# Patient Record
Sex: MALE | HISPANIC OR LATINO | Employment: PART TIME | URBAN - NONMETROPOLITAN AREA
[De-identification: names, ages, dates, MRNs, and addresses within clinical notes are randomized per-mention and may not be internally consistent; named-entity substitution may affect disease eponyms.]

---
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
## 2020-08-25 ENCOUNTER — OFFICE VISIT (OUTPATIENT)
Dept: FAMILY MEDICINE CLINIC | Facility: CLINIC | Age: 20
End: 2020-08-25

## 2020-08-25 VITALS
HEIGHT: 67 IN | WEIGHT: 207.8 LBS | TEMPERATURE: 98.6 F | SYSTOLIC BLOOD PRESSURE: 130 MMHG | BODY MASS INDEX: 32.62 KG/M2 | DIASTOLIC BLOOD PRESSURE: 82 MMHG | HEART RATE: 110 BPM | RESPIRATION RATE: 18 BRPM | OXYGEN SATURATION: 98 %

## 2020-08-25 DIAGNOSIS — K92.0 HEMATEMESIS WITH NAUSEA: ICD-10-CM

## 2020-08-25 DIAGNOSIS — E66.09 CLASS 1 OBESITY DUE TO EXCESS CALORIES WITH SERIOUS COMORBIDITY AND BODY MASS INDEX (BMI) OF 32.0 TO 32.9 IN ADULT: ICD-10-CM

## 2020-08-25 DIAGNOSIS — Z13.220 LIPID SCREENING: ICD-10-CM

## 2020-08-25 DIAGNOSIS — K21.00 GERD WITH ESOPHAGITIS: Primary | ICD-10-CM

## 2020-08-25 DIAGNOSIS — R00.0 TACHYCARDIA: ICD-10-CM

## 2020-08-25 PROBLEM — E66.811 CLASS 1 OBESITY DUE TO EXCESS CALORIES WITH SERIOUS COMORBIDITY AND BODY MASS INDEX (BMI) OF 32.0 TO 32.9 IN ADULT: Status: ACTIVE | Noted: 2020-08-25

## 2020-08-25 PROCEDURE — 99202 OFFICE O/P NEW SF 15 MIN: CPT | Performed by: FAMILY MEDICINE

## 2020-08-25 RX ORDER — PANTOPRAZOLE SODIUM 40 MG/1
40 TABLET, DELAYED RELEASE ORAL 2 TIMES DAILY
Qty: 60 TABLET | Refills: 0 | Status: SHIPPED | OUTPATIENT
Start: 2020-08-25 | End: 2020-09-18

## 2020-08-25 NOTE — PROGRESS NOTES
Subjective cc: GERD   Ezekiel Palmer is a 19 y.o. male who presents with complaint of abd pain with vomiting - sometimes blood ( 3x in the past few months) worse in the past 3 days.  He is complaining of cough, bloating and abd pain, worse at night lying down, wakes from sleep. Nothing makes it better or worse. Poor compliance with medications. He has not been on PPI in the past 3 years.     Depression/anxiety: trazodone and prozac - doesn't take them consistently  H/o ulcers   GERD: omeprazole   Hiatal hernia   Has seen GI in the past - EGD 4-5 years ago     PSH: EGD and dental surgery   No past hospitalization   Grad [employer] with some college     Former smoker, not currently smoking   Drinks alcohol - 4-5 shots per week   Smokes MJ weekly     FH: DM in mother and grandparents, CAD in father, cancer - unknown in great grandmother.     Heartburn   He complains of abdominal pain, coughing and heartburn. He reports no belching, no chest pain, no choking, no dysphagia, no early satiety, no globus sensation, no hoarse voice, no nausea, no sore throat, no stridor, no tooth decay, no water brash or no wheezing. This is a recurrent problem. The current episode started more than 1 year ago. The problem occurs constantly. The problem has been gradually worsening. The heartburn is located in the substernum. The heartburn is of moderate intensity. The heartburn wakes him from sleep. The heartburn limits his activity. The heartburn changes with position. The symptoms are aggravated by certain foods and lying down. Pertinent negatives include no anemia, fatigue, melena, muscle weakness, orthopnea or weight loss. Risk factors include ETOH use, lack of exercise, NSAIDs, obesity and smoking/tobacco exposure. He has tried a PPI for the symptoms. The treatment provided mild relief. Past procedures include an EGD.        The following portions of the patient's history were reviewed and updated as appropriate: allergies,  "current medications, past family history, past medical history, past social history, past surgical history and problem list.        Review of Systems   Constitutional: Negative for activity change, appetite change, fatigue and weight loss.   HENT: Negative for hoarse voice and sore throat.    Respiratory: Positive for cough. Negative for choking and wheezing.    Cardiovascular: Negative for chest pain.   Gastrointestinal: Positive for abdominal pain and heartburn. Negative for dysphagia, melena and nausea.   Musculoskeletal: Negative for muscle weakness.   All other systems reviewed and are negative.      Objective   Blood pressure 130/82, pulse 110, temperature 98.6 °F (37 °C), temperature source Infrared, resp. rate 18, height 171 cm (67.32\"), weight 94.3 kg (207 lb 12.8 oz), SpO2 98 %.  Physical Exam   Constitutional: He is oriented to person, place, and time. He appears well-developed and well-nourished. No distress.   obese   HENT:   Head: Normocephalic and atraumatic.   Right Ear: External ear normal.   Left Ear: External ear normal.   Eyes: Conjunctivae and EOM are normal. Right eye exhibits no discharge. Left eye exhibits no discharge.   Neck: Normal range of motion. No tracheal deviation present. No thyromegaly present.   Cardiovascular: Regular rhythm, normal heart sounds and intact distal pulses.   Pulmonary/Chest: Effort normal and breath sounds normal. No stridor. No respiratory distress. He has no wheezes. He exhibits no tenderness.   Abdominal: Soft. Bowel sounds are normal. He exhibits no distension. There is no tenderness.   Musculoskeletal: Normal range of motion. He exhibits no edema.   Lymphadenopathy:     He has no cervical adenopathy.   Neurological: He is alert and oriented to person, place, and time. He exhibits normal muscle tone. Coordination normal.   Skin: Skin is warm and dry. He is not diaphoretic.   Psychiatric: He has a normal mood and affect. His behavior is normal. Judgment and " thought content normal.   Nursing note and vitals reviewed.      Assessment/Plan   Problems Addressed this Visit        Digestive    GERD with esophagitis - Primary    Relevant Medications    pantoprazole (PROTONIX) 40 MG EC tablet    Other Relevant Orders    Ambulatory Referral to Gastroenterology    Comprehensive Metabolic Panel    CBC (No Diff)    Class 1 obesity due to excess calories with serious comorbidity and body mass index (BMI) of 32.0 to 32.9 in adult      Other Visit Diagnoses     Hematemesis with nausea        Relevant Medications    pantoprazole (PROTONIX) 40 MG EC tablet    Other Relevant Orders    Ambulatory Referral to Gastroenterology    Comprehensive Metabolic Panel    CBC (No Diff)    Lipid screening        Relevant Orders    Lipid Panel    Tachycardia        Relevant Orders    TSH    T4, Free          PLAN:     #1 GERD: new to me, chronic for pt, restart on PPI at increased dosing, referral to GI for EGD, advised on lifestyle changes - quit using NSAIDs, lose weight, no alcohol use, advised on warning signs and need to return if worsening     #2 obesity: Patient's Body mass index is 32.23 kg/m². BMI is above normal parameters. Recommendations include: educational material, exercise counseling and nutrition counseling.    #3 tachycardia: new, will get labs           This document has been electronically signed by Jazzy Infante MD on August 25, 2020 10:10

## 2020-08-27 LAB
ALBUMIN SERPL-MCNC: 5.2 G/DL (ref 3.5–5.2)
ALBUMIN/GLOB SERPL: 2.6 G/DL
ALP SERPL-CCNC: 71 U/L (ref 39–117)
ALT SERPL-CCNC: 47 U/L (ref 1–41)
AST SERPL-CCNC: 35 U/L (ref 1–40)
BILIRUB SERPL-MCNC: 0.8 MG/DL (ref 0–1.2)
BUN SERPL-MCNC: 11 MG/DL (ref 6–20)
BUN/CREAT SERPL: 12.1 (ref 7–25)
CALCIUM SERPL-MCNC: 10.8 MG/DL (ref 8.6–10.5)
CHLORIDE SERPL-SCNC: 101 MMOL/L (ref 98–107)
CHOLEST SERPL-MCNC: 194 MG/DL (ref 0–200)
CO2 SERPL-SCNC: 28.5 MMOL/L (ref 22–29)
CREAT SERPL-MCNC: 0.91 MG/DL (ref 0.76–1.27)
ERYTHROCYTE [DISTWIDTH] IN BLOOD BY AUTOMATED COUNT: 13.1 % (ref 12.3–15.4)
GLOBULIN SER CALC-MCNC: 2 GM/DL
GLUCOSE SERPL-MCNC: 92 MG/DL (ref 65–99)
HCT VFR BLD AUTO: 51.2 % (ref 37.5–51)
HDLC SERPL-MCNC: 36 MG/DL (ref 40–60)
HGB BLD-MCNC: 16.5 G/DL (ref 13–17.7)
LDLC SERPL CALC-MCNC: 122 MG/DL (ref 0–100)
MCH RBC QN AUTO: 29.3 PG (ref 26.6–33)
MCHC RBC AUTO-ENTMCNC: 32.2 G/DL (ref 31.5–35.7)
MCV RBC AUTO: 90.8 FL (ref 79–97)
PLATELET # BLD AUTO: 362 10*3/MM3 (ref 140–450)
POTASSIUM SERPL-SCNC: 4.9 MMOL/L (ref 3.5–5.2)
PROT SERPL-MCNC: 7.2 G/DL (ref 6–8.5)
RBC # BLD AUTO: 5.64 10*6/MM3 (ref 4.14–5.8)
SODIUM SERPL-SCNC: 139 MMOL/L (ref 136–145)
T4 FREE SERPL-MCNC: 1.34 NG/DL (ref 0.93–1.7)
TRIGL SERPL-MCNC: 180 MG/DL (ref 0–150)
TSH SERPL DL<=0.005 MIU/L-ACNC: 0.98 UIU/ML (ref 0.27–4.2)
VLDLC SERPL CALC-MCNC: 36 MG/DL
WBC # BLD AUTO: 6.26 10*3/MM3 (ref 3.4–10.8)

## 2020-09-17 ENCOUNTER — TELEPHONE (OUTPATIENT)
Dept: PODIATRY | Facility: CLINIC | Age: 20
End: 2020-09-17

## 2020-09-17 NOTE — TELEPHONE ENCOUNTER
Pt's mother (soy) called to notify us that the protonix was not covered by pt's ins and wanted to know if you could call in an alternative

## 2020-09-18 DIAGNOSIS — K21.00 GERD WITH ESOPHAGITIS: Primary | ICD-10-CM

## 2020-09-18 RX ORDER — OMEPRAZOLE 40 MG/1
40 CAPSULE, DELAYED RELEASE ORAL DAILY
Qty: 90 CAPSULE | Refills: 0 | Status: ON HOLD | OUTPATIENT
Start: 2020-09-18 | End: 2020-11-16 | Stop reason: SDUPTHER

## 2020-10-02 ENCOUNTER — TELEMEDICINE (OUTPATIENT)
Dept: GASTROENTEROLOGY | Facility: CLINIC | Age: 20
End: 2020-10-02

## 2020-10-02 ENCOUNTER — TELEPHONE (OUTPATIENT)
Dept: GASTROENTEROLOGY | Facility: CLINIC | Age: 20
End: 2020-10-02

## 2020-10-02 VITALS — BODY MASS INDEX: 32.49 KG/M2 | WEIGHT: 207 LBS | HEIGHT: 67 IN

## 2020-10-02 DIAGNOSIS — R11.0 NAUSEA: ICD-10-CM

## 2020-10-02 DIAGNOSIS — R10.13 EPIGASTRIC PAIN: Primary | ICD-10-CM

## 2020-10-02 DIAGNOSIS — K21.9 GASTROESOPHAGEAL REFLUX DISEASE, UNSPECIFIED WHETHER ESOPHAGITIS PRESENT: ICD-10-CM

## 2020-10-02 PROCEDURE — 99204 OFFICE O/P NEW MOD 45 MIN: CPT | Performed by: NURSE PRACTITIONER

## 2020-10-02 NOTE — PROGRESS NOTES
"[facility] GASTROENTEROLOGY - OFFICE NOTE    10/2/2020    Ezekiel Palmer   2000    Primary Physician: Jazzy Infante MD    Chief Complaint   Patient presents with   • Nausea     for a couple years worse over the past year ulcer and hernia on egd 2017 Dr Huber in [place]   • Heartburn         HISTORY OF PRESENT ILLNESS:    Ezekiel Palmer is a 19 y.o. male presents with abdominal pain. This is frankie. This is intermittent, however occurs daily. Described as a sharp pain.  Triggers are acidic and spicy foods.   Associated symptoms area nausea, vomiting, and belching. Belching does help.  He has having some reflux symptoms as well.  Was taking ibuprofen qod for the frankie pain since 2017 and stopped recently. Was taking a \"very large pills for the pain \" since 2017 and did help ( 2017 and 2018).  States that he has lost weight ( 8 - 10 #). Appetite is good. No black stool. No hematemesis. No fever.  No bright red blood per rectum.    He recently was prescribed omeprazole by his PCP however he has not started taking.  He is taking Carafate daily.    I did review an ultrasound of the gallbladder that was done in 2017 in [place] and that was unremarkable.    Last EGD July 2017 by Dr. Huber ( [place]) noting severe esophagitis and diffuse moderate inflammation/erythema in the stomach, and a hiatal hernia.  H. pylori was negative.  Antrum biopsy path benign.  Esophageal biopsy path ulcer/benign.    Has never had gallbladder test.       Past Medical History:   Diagnosis Date   • Acute gastroenteritis    • Allergic rhinitis    • Diarrhea    • Exposure to tobacco smoke    • Gastroenteritis    • GERD (gastroesophageal reflux disease)    • Headache    • Nausea and vomiting     with body aches   • Rhinitis    • Tachycardia    • Upper respiratory infection    • Well child check        Past Surgical History:   Procedure Laterality Date   • DENTAL PROCEDURE  07/03/2001    " frenuloplasty Dr. Pearlta   • ENDOSCOPY N/A 7/28/2017    Procedure: ESOPHAGOGASTRODUODENOSCOPY possible dilation ;  Surgeon: Rodney Huber MD;  Location: [facility] ENDOSCOPY;  Service:    • TYMPANOSTOMY  07/03/2001    Dr. Peralta       Outpatient Medications Marked as Taking for the 10/2/20 encounter (Telemedicine) with Ro Hill APRN   Medication Sig Dispense Refill   • loratadine (CLARITIN) 10 MG tablet Take 10 mg by mouth.     • omeprazole (priLOSEC) 40 MG capsule Take 1 capsule by mouth Daily. 90 capsule 0   • sucralfate (CARAFATE) 1 G tablet Take 1 tablet by mouth 4 (Four) Times a Day. 120 tablet 3       Allergies   Allergen Reactions   • Mold Extract  [Trichophyton]        Social History     Socioeconomic History   • Marital status: Single     Spouse name: Not on file   • Number of children: Not on file   • Years of education: Not on file   • Highest education level: Not on file   Tobacco Use   • Smoking status: Former Smoker   • Smokeless tobacco: Never Used   Substance and Sexual Activity   • Alcohol use: No   • Drug use: No   • Sexual activity: Defer       Family History   Problem Relation Age of Onset   • Diabetes Mother    • Heart attack Maternal Grandfather    • Hypertension Maternal Grandfather    • Diabetes Maternal Grandfather    • Lung cancer Other    • Colon cancer Neg Hx        Review of Systems   Constitutional: Negative for appetite change, chills, fatigue, fever and unexpected weight change.   HENT: Negative for sore throat and trouble swallowing.    Eyes: Negative for visual disturbance.   Respiratory: Negative for cough, shortness of breath and wheezing.    Cardiovascular: Negative for chest pain and palpitations.   Gastrointestinal: Positive for abdominal pain, nausea and vomiting. Negative for abdominal distention, anal bleeding, blood in stool, constipation and diarrhea.        As mentioned in hpi   Genitourinary: Negative for difficulty urinating and hematuria.   Musculoskeletal: Negative  "for arthralgias and back pain.   Skin: Negative for color change and rash.   Neurological: Negative for dizziness, seizures, syncope, light-headedness and headaches.   Hematological: Negative for adenopathy.   Psychiatric/Behavioral: Negative for confusion. The patient is not nervous/anxious.         Vitals:    10/02/20 0825   Weight: 93.9 kg (207 lb)   Height: 170.2 cm (67\")      Body mass index is 32.42 kg/m².    Physical Exam   Constitutional: He appears well-developed and well-nourished. No distress.   Pulmonary/Chest: Effort normal.   Neurological: He is alert.         Results for orders placed or performed in visit on 08/25/20   Comprehensive Metabolic Panel    Specimen: Blood   Result Value Ref Range    Glucose 92 65 - 99 mg/dL    BUN 11 6 - 20 mg/dL    Creatinine 0.91 0.76 - 1.27 mg/dL    eGFR Non African Am 105 >60 mL/min/1.73    eGFR African Am 127 >60 mL/min/1.73    BUN/Creatinine Ratio 12.1 7.0 - 25.0    Sodium 139 136 - 145 mmol/L    Potassium 4.9 3.5 - 5.2 mmol/L    Chloride 101 98 - 107 mmol/L    Total CO2 28.5 22.0 - 29.0 mmol/L    Calcium 10.8 (H) 8.6 - 10.5 mg/dL    Total Protein 7.2 6.0 - 8.5 g/dL    Albumin 5.20 3.50 - 5.20 g/dL    Globulin 2.0 gm/dL    A/G Ratio 2.6 g/dL    Total Bilirubin 0.8 0.0 - 1.2 mg/dL    Alkaline Phosphatase 71 39 - 117 U/L    AST (SGOT) 35 1 - 40 U/L    ALT (SGPT) 47 (H) 1 - 41 U/L   Lipid Panel    Specimen: Blood   Result Value Ref Range    Total Cholesterol 194 0 - 200 mg/dL    Triglycerides 180 (H) 0 - 150 mg/dL    HDL Cholesterol 36 (L) 40 - 60 mg/dL    VLDL Cholesterol 36 mg/dL    LDL Cholesterol  122 (H) 0 - 100 mg/dL   CBC (No Diff)    Specimen: Blood   Result Value Ref Range    WBC 6.26 3.40 - 10.80 10*3/mm3    RBC 5.64 4.14 - 5.80 10*6/mm3    Hemoglobin 16.5 13.0 - 17.7 g/dL    Hematocrit 51.2 (H) 37.5 - 51.0 %    MCV 90.8 79.0 - 97.0 fL    MCH 29.3 26.6 - 33.0 pg    MCHC 32.2 31.5 - 35.7 g/dL    RDW 13.1 12.3 - 15.4 %    Platelets 362 140 - 450 10*3/mm3   TSH "    Specimen: Blood   Result Value Ref Range    TSH 0.976 0.270 - 4.200 uIU/mL   T4, Free    Specimen: Blood   Result Value Ref Range    Free T4 1.34 0.93 - 1.70 ng/dL           ASSESSMENT AND PLAN    Assessment/Plan     Ezekiel was seen today for nausea and heartburn.    Diagnoses and all orders for this visit:    Epigastric pain  -     Case Request; Standing  -     Case Request    Gastroesophageal reflux disease, unspecified whether esophagitis present  -     Case Request; Standing  -     Case Request    Nausea  -     Case Request; Standing  -     Case Request      Differential diagnoses discussed.  I do recommend upper endoscopy for further evaluation and he is agreeable..  I do recommend that he start taking omeprazole once daily in the morning.  I would also recommend continue taking Carafate daily.  I recommend no aspirin or NSAIDs.  Recommend ER for evaluation of severe pain.        Recent labs reviewed with a normal CBC and TSH.  Ultrasound gallbladder 2017 in [place] and was unremarkable.      ESOPHAGOGASTRODUODENOSCOPY WITH ANESTHESIA (N/A)   Risk, benefits, and alternatives of endoscopy were explained in full.  They understand that there is a risk of bleeding, perforation, and infection.  The risk of perforation goes up with esophageal dilation.  Other options to evaluate UGI complaints could involve barium swallow or UGI series, but these would be diagnostic tests only.  Patient was given time to ask questions.  I answered them to their satisfaction and they are agreeable to proceeding                  JAYNA Chau    EMR Dragon/transcription disclaimer:  Much of this encounter note is electronic transcription/translation of spoken language to printed text.  The electronic translation of spoken language may be erroneous, or at times, nonsensical words or phrases may be inadvertently transcribed.  Although I have reviewed the note for such errors, some may still exist.

## 2020-10-12 ENCOUNTER — TRANSCRIBE ORDERS (OUTPATIENT)
Dept: ADMINISTRATIVE | Facility: HOSPITAL | Age: 20
End: 2020-10-12

## 2020-10-19 ENCOUNTER — HOSPITAL ENCOUNTER (OUTPATIENT)
Facility: HOSPITAL | Age: 20
Setting detail: HOSPITAL OUTPATIENT SURGERY
Discharge: HOME | End: 2020-10-19
Attending: INTERNAL MEDICINE | Admitting: INTERNAL MEDICINE

## 2020-10-19 ENCOUNTER — TELEPHONE (OUTPATIENT)
Dept: GASTROENTEROLOGY | Facility: CLINIC | Age: 20
End: 2020-10-19

## 2020-10-19 NOTE — TELEPHONE ENCOUNTER
JUSTINE, the patient showed today for his procedure but did not have his recommended Covid testing.  As discussed with the staff, he was informed that it is required to have Covid testing preprocedure.  He unfortunately also showed 45 minutes late for his procedure.  With showing late we were not able to obtain Covid testing in time to do the procedure.  He will need to have the procedure rescheduled at a different time and have preprocedure Covid testing as discussed with him previously.  Please contact him and reschedule.

## 2020-11-06 ENCOUNTER — TRANSCRIBE ORDERS (OUTPATIENT)
Dept: LAB | Facility: HOSPITAL | Age: 20
End: 2020-11-06

## 2020-11-06 DIAGNOSIS — Z01.818 PRE-OP TESTING: Primary | ICD-10-CM

## 2020-11-13 ENCOUNTER — LAB (OUTPATIENT)
Dept: LAB | Facility: HOSPITAL | Age: 20
End: 2020-11-13

## 2020-11-13 PROCEDURE — C9803 HOPD COVID-19 SPEC COLLECT: HCPCS | Performed by: INTERNAL MEDICINE

## 2020-11-13 PROCEDURE — U0003 INFECTIOUS AGENT DETECTION BY NUCLEIC ACID (DNA OR RNA); SEVERE ACUTE RESPIRATORY SYNDROME CORONAVIRUS 2 (SARS-COV-2) (CORONAVIRUS DISEASE [COVID-19]), AMPLIFIED PROBE TECHNIQUE, MAKING USE OF HIGH THROUGHPUT TECHNOLOGIES AS DESCRIBED BY CMS-2020-01-R: HCPCS | Performed by: INTERNAL MEDICINE

## 2020-11-14 LAB
COVID LABCORP PRIORITY: NORMAL
SARS-COV-2 RNA RESP QL NAA+PROBE: NOT DETECTED

## 2020-11-16 ENCOUNTER — ANESTHESIA (OUTPATIENT)
Dept: GASTROENTEROLOGY | Facility: HOSPITAL | Age: 20
End: 2020-11-16

## 2020-11-16 ENCOUNTER — HOSPITAL ENCOUNTER (OUTPATIENT)
Facility: HOSPITAL | Age: 20
Setting detail: HOSPITAL OUTPATIENT SURGERY
Discharge: HOME | End: 2020-11-16
Attending: INTERNAL MEDICINE | Admitting: INTERNAL MEDICINE

## 2020-11-16 ENCOUNTER — PREP FOR SURGERY (OUTPATIENT)
Dept: OTHER | Facility: HOSPITAL | Age: 20
End: 2020-11-16

## 2020-11-16 ENCOUNTER — ANESTHESIA EVENT (OUTPATIENT)
Dept: GASTROENTEROLOGY | Facility: HOSPITAL | Age: 20
End: 2020-11-16

## 2020-11-16 VITALS
HEIGHT: 67 IN | BODY MASS INDEX: 33.9 KG/M2 | RESPIRATION RATE: 19 BRPM | HEART RATE: 73 BPM | OXYGEN SATURATION: 100 % | TEMPERATURE: 97.1 F | SYSTOLIC BLOOD PRESSURE: 118 MMHG | WEIGHT: 216 LBS | DIASTOLIC BLOOD PRESSURE: 81 MMHG

## 2020-11-16 DIAGNOSIS — K21.00 GERD WITH ESOPHAGITIS: ICD-10-CM

## 2020-11-16 DIAGNOSIS — K21.9 GERD (GASTROESOPHAGEAL REFLUX DISEASE): ICD-10-CM

## 2020-11-16 DIAGNOSIS — K21.00 GASTROESOPHAGEAL REFLUX DISEASE WITH ESOPHAGITIS, UNSPECIFIED WHETHER HEMORRHAGE: Primary | ICD-10-CM

## 2020-11-16 PROCEDURE — 88305 TISSUE EXAM BY PATHOLOGIST: CPT | Performed by: INTERNAL MEDICINE

## 2020-11-16 PROCEDURE — 43239 EGD BIOPSY SINGLE/MULTIPLE: CPT | Performed by: INTERNAL MEDICINE

## 2020-11-16 PROCEDURE — 25010000002 PROPOFOL 10 MG/ML EMULSION: Performed by: NURSE ANESTHETIST, CERTIFIED REGISTERED

## 2020-11-16 RX ORDER — LIDOCAINE HYDROCHLORIDE 10 MG/ML
0.5 INJECTION, SOLUTION EPIDURAL; INFILTRATION; INTRACAUDAL; PERINEURAL ONCE AS NEEDED
Status: DISCONTINUED | OUTPATIENT
Start: 2020-11-16 | End: 2020-11-16 | Stop reason: HOSPADM

## 2020-11-16 RX ORDER — OMEPRAZOLE 40 MG/1
40 CAPSULE, DELAYED RELEASE ORAL
Qty: 180 CAPSULE | Refills: 3 | Status: SHIPPED | OUTPATIENT
Start: 2020-11-16 | End: 2020-11-24 | Stop reason: SDUPTHER

## 2020-11-16 RX ORDER — PROPOFOL 10 MG/ML
VIAL (ML) INTRAVENOUS AS NEEDED
Status: DISCONTINUED | OUTPATIENT
Start: 2020-11-16 | End: 2020-11-16 | Stop reason: SURG

## 2020-11-16 RX ORDER — SODIUM CHLORIDE 0.9 % (FLUSH) 0.9 %
10 SYRINGE (ML) INJECTION AS NEEDED
Status: DISCONTINUED | OUTPATIENT
Start: 2020-11-16 | End: 2020-11-16 | Stop reason: HOSPADM

## 2020-11-16 RX ORDER — ONDANSETRON 2 MG/ML
4 INJECTION INTRAMUSCULAR; INTRAVENOUS ONCE AS NEEDED
Status: DISCONTINUED | OUTPATIENT
Start: 2020-11-16 | End: 2020-11-16 | Stop reason: HOSPADM

## 2020-11-16 RX ORDER — SODIUM CHLORIDE 9 MG/ML
500 INJECTION, SOLUTION INTRAVENOUS CONTINUOUS PRN
Status: DISCONTINUED | OUTPATIENT
Start: 2020-11-16 | End: 2020-11-16 | Stop reason: HOSPADM

## 2020-11-16 RX ADMIN — SODIUM CHLORIDE: 9 INJECTION, SOLUTION INTRAVENOUS at 10:44

## 2020-11-16 RX ADMIN — LIDOCAINE HYDROCHLORIDE 100 MG: 20 INJECTION, SOLUTION INTRAVENOUS at 10:46

## 2020-11-16 RX ADMIN — PROPOFOL 50 MG: 10 INJECTION, EMULSION INTRAVENOUS at 10:47

## 2020-11-16 RX ADMIN — SODIUM CHLORIDE 500 ML: 9 INJECTION, SOLUTION INTRAVENOUS at 10:20

## 2020-11-16 RX ADMIN — PROPOFOL 100 MG: 10 INJECTION, EMULSION INTRAVENOUS at 10:46

## 2020-11-16 NOTE — ANESTHESIA PREPROCEDURE EVALUATION
Anesthesia Evaluation     Patient summary reviewed and Nursing notes reviewed   no history of anesthetic complications:  NPO Solid Status: > 8 hours  NPO Liquid Status: > 4 hours           Airway   Mallampati: II  TM distance: >3 FB  Neck ROM: full  no difficulty expected  Dental      Pulmonary - negative pulmonary ROS   Cardiovascular - negative cardio ROS  Exercise tolerance: excellent (>7 METS)        Neuro/Psych  (+) headaches,     GI/Hepatic/Renal/Endo    (+) obesity,  GERD,      Musculoskeletal (-) negative ROS    Abdominal    Substance History - negative use     OB/GYN negative ob/gyn ROS         Other - negative ROS                         Anesthesia Plan    ASA 1     MAC       Anesthetic plan, all risks, benefits, and alternatives have been provided, discussed and informed consent has been obtained with: patient.

## 2020-11-16 NOTE — H&P
[facility] Gastroenterology  Pre Procedure History & Physical    Chief Complaint:   Heartburn    Subjective     HPI:   He came to see us in early October.  He was having some nausea, vomiting and belching.  He had excessive heartburn.  He has a history of reflux esophagitis diagnosed in outside facility in [place] 2017.  When he was in the office October 2 he was advised to go ahead and start omeprazole which he had been prescribed but had not started yet.  He states he has been taking this.  He states he feels much better.  He continues on Carafate daily as well.  He presents for endoscopy now.  Overall though he is improved he states.    Past Medical History:   Past Medical History:   Diagnosis Date   • Acute gastroenteritis    • Allergic rhinitis    • Diarrhea    • Exposure to tobacco smoke    • Gastroenteritis    • GERD (gastroesophageal reflux disease)    • Headache    • Nausea and vomiting     with body aches   • Rhinitis    • Tachycardia    • Upper respiratory infection    • Well child check        Past Surgical History:  Past Surgical History:   Procedure Laterality Date   • DENTAL PROCEDURE  07/03/2001    frenuloplasty Dr. Peralta   • ENDOSCOPY N/A 7/28/2017    Procedure: ESOPHAGOGASTRODUODENOSCOPY possible dilation ;  Surgeon: Rodney Huber MD;  Location: [facility] ENDOSCOPY;  Service:    • TYMPANOSTOMY  07/03/2001    Dr. Peralta        Family History:  Family History   Problem Relation Age of Onset   • Diabetes Mother    • Heart attack Maternal Grandfather    • Hypertension Maternal Grandfather    • Diabetes Maternal Grandfather    • Lung cancer Other    • Colon cancer Neg Hx        Social History:   reports that he has quit smoking. He has never used smokeless tobacco. He reports that he does not drink alcohol or use drugs.    Medications:   Prior to Admission medications    Medication Sig Start Date End Date Taking? Authorizing Provider   loratadine (CLARITIN) 10 MG tablet Take 10 mg by mouth.     "Provider, MD Leo   omeprazole (priLOSEC) 40 MG capsule Take 1 capsule by mouth Daily. 9/18/20   Jazzy Infante MD   sucralfate (CARAFATE) 1 G tablet Take 1 tablet by mouth 4 (Four) Times a Day. 8/10/17   Sayda Forrester APRN       Allergies:  Mold extract [trichophyton]    ROS:    General: Weight stable  Resp: No SOA  Cardiovascular: No CP    Objective     Blood pressure 139/83, pulse 70, temperature 97.1 °F (36.2 °C), temperature source Temporal, resp. rate 20, height 170.2 cm (67\"), weight 98 kg (216 lb), SpO2 99 %.    Physical Exam   Constitutional: Pt is oriented to person, place, and in no distress.   Cardiovascular: Normal rate, regular rhythm.    Pulmonary/Chest: Effort normal. No respiratory distress.    Abdominal: Non-distended.  Psychiatric: Mood, memory, affect and judgment appear normal.     Assessment/Plan     Diagnosis:  GERD with reflux esophagitis history    Anticipated Surgical Procedure:  Endoscopy    The risks, benefits, and alternatives of this procedure have been discussed with the patient or the responsible party- the patient understands and agrees to proceed.    EMR Dragon/transcription disclaimer:  Much of this encounter note is electronic transcription/translation of spoken language to printed text.  The electronic translation of spoken language may be erroneous, or at times, nonsensical words or phrases may be inadvertently transcribed.  Although I have reviewed the note for such errors, some may still exist.  "

## 2020-11-16 NOTE — DISCHARGE INSTRUCTIONS
You have another Endoscopy scheduled for January 18th, at 6:30 AM.  Dr. Rasheed's office will mail you your instructions.

## 2020-11-16 NOTE — ANESTHESIA POSTPROCEDURE EVALUATION
"Patient: Ezekiel Palmer    Procedure Summary     Date: 11/16/20 Room / Location: [facility] ENDOSCOPY 5 / [facility] ENDOSCOPY    Anesthesia Start: 1044 Anesthesia Stop: 1058    Procedure: ESOPHAGOGASTRODUODENOSCOPY WITH ANESTHESIA (N/A ) Diagnosis:     Surgeon: Bill Rasheed MD Provider: Nash Ying CRNA    Anesthesia Type: MAC ASA Status: 1          Anesthesia Type: MAC    Vitals  Vitals Value Taken Time   BP 91/78 11/16/20 1110   Temp     Pulse 74 11/16/20 1114   Resp     SpO2 98 % 11/16/20 1114   Vitals shown include unvalidated device data.        Post Anesthesia Care and Evaluation    Patient location during evaluation: PACU  Patient participation: complete - patient participated  Level of consciousness: awake and alert  Pain management: adequate  Airway patency: patent  Anesthetic complications: No anesthetic complications    Cardiovascular status: acceptable  Respiratory status: acceptable  Hydration status: acceptable    Comments: Blood pressure 139/83, pulse 70, temperature 97.1 °F (36.2 °C), temperature source Temporal, resp. rate 20, height 170.2 cm (67\"), weight 98 kg (216 lb), SpO2 99 %.    Pt discharged from PACU based on tawanna score >8      "

## 2020-11-17 LAB
CYTO UR: NORMAL
LAB AP CASE REPORT: NORMAL
LAB AP DIAGNOSIS COMMENT: NORMAL
PATH REPORT.FINAL DX SPEC: NORMAL
PATH REPORT.GROSS SPEC: NORMAL

## 2020-11-18 PROBLEM — K21.00 GASTROESOPHAGEAL REFLUX DISEASE WITH ESOPHAGITIS: Status: ACTIVE | Noted: 2020-11-18

## 2020-11-24 ENCOUNTER — OFFICE VISIT (OUTPATIENT)
Dept: FAMILY MEDICINE CLINIC | Facility: CLINIC | Age: 20
End: 2020-11-24

## 2020-11-24 VITALS
DIASTOLIC BLOOD PRESSURE: 84 MMHG | WEIGHT: 216.8 LBS | SYSTOLIC BLOOD PRESSURE: 124 MMHG | OXYGEN SATURATION: 98 % | TEMPERATURE: 97.7 F | RESPIRATION RATE: 18 BRPM | HEIGHT: 67 IN | BODY MASS INDEX: 34.03 KG/M2 | HEART RATE: 86 BPM

## 2020-11-24 DIAGNOSIS — E66.09 CLASS 1 OBESITY DUE TO EXCESS CALORIES WITH SERIOUS COMORBIDITY AND BODY MASS INDEX (BMI) OF 33.0 TO 33.9 IN ADULT: ICD-10-CM

## 2020-11-24 DIAGNOSIS — K21.00 GERD WITH ESOPHAGITIS: Primary | ICD-10-CM

## 2020-11-24 PROCEDURE — 99213 OFFICE O/P EST LOW 20 MIN: CPT | Performed by: FAMILY MEDICINE

## 2020-11-24 RX ORDER — OMEPRAZOLE 40 MG/1
40 CAPSULE, DELAYED RELEASE ORAL
Qty: 180 CAPSULE | Refills: 3 | Status: SHIPPED | OUTPATIENT
Start: 2020-11-24

## 2020-11-24 NOTE — PROGRESS NOTES
Subjective cc: GERD   Ezekiel Palmer is a 19 y.o. male who presents with esophagitis - had EGD with GI - has not been able to get his PPI to start on it.   Reviewed EGD report   Pt is gaining weight - works at [employer] - eats a lot of bread     Past information reviewed   ===========================  Depression/anxiety: trazodone and prozac - doesn't take them consistently  H/o ulcers   GERD: omeprazole   Hiatal hernia   Has seen GI in the past - EGD 4-5 years ago     PSH: EGD and dental surgery   No past hospitalization   Grad [employer] with some college     Former smoker, not currently smoking   Drinks alcohol - 4-5 shots per week   Smokes MJ weekly     FH: DM in mother and grandparents, CAD in father, cancer - unknown in great grandmother.     Heartburn  He complains of abdominal pain and heartburn. He reports no belching, no chest pain, no choking, no dysphagia, no early satiety, no globus sensation, no hoarse voice, no nausea, no sore throat, no stridor, no tooth decay, no water brash or no wheezing. This is a recurrent problem. The current episode started more than 1 year ago. The problem occurs constantly. The problem has been gradually worsening. The heartburn is located in the substernum. The heartburn is of moderate intensity. The heartburn wakes him from sleep. The heartburn limits his activity. The heartburn changes with position. The symptoms are aggravated by certain foods and lying down. Pertinent negatives include no anemia, fatigue, melena, muscle weakness, orthopnea or weight loss. Risk factors include ETOH use, lack of exercise, NSAIDs, obesity and smoking/tobacco exposure. He has tried a PPI for the symptoms. The treatment provided mild relief. Past procedures include an EGD.        The following portions of the patient's history were reviewed and updated as appropriate: allergies, current medications, past family history, past medical history, past social history, past surgical history and  "problem list.        Review of Systems   Constitutional: Negative for activity change, appetite change, fatigue and weight loss.   HENT: Negative for hoarse voice and sore throat.    Respiratory: Negative for choking and wheezing.    Cardiovascular: Negative for chest pain.   Gastrointestinal: Positive for abdominal pain and heartburn. Negative for dysphagia, melena and nausea.   Musculoskeletal: Negative for muscle weakness.   All other systems reviewed and are negative.      Objective   Blood pressure 124/84, pulse 86, temperature 97.7 °F (36.5 °C), temperature source Infrared, resp. rate 18, height 170.2 cm (67\"), weight 98.3 kg (216 lb 12.8 oz), SpO2 98 %.  Physical Exam   Constitutional: He is oriented to person, place, and time. He appears well-developed. No distress.   obese   HENT:   Head: Normocephalic and atraumatic.   Right Ear: External ear normal.   Left Ear: External ear normal.   Eyes: Conjunctivae are normal. Right eye exhibits no discharge. Left eye exhibits no discharge.   Neck: Normal range of motion. No tracheal deviation present. No thyromegaly present.   Cardiovascular: Normal rate, regular rhythm and normal heart sounds.   Pulmonary/Chest: Effort normal and breath sounds normal. No stridor. No respiratory distress. He has no wheezes. He exhibits no tenderness.   Abdominal: Soft. Bowel sounds are normal. He exhibits no distension. There is no abdominal tenderness.   Musculoskeletal: Normal range of motion.   Lymphadenopathy:     He has no cervical adenopathy.   Neurological: He is alert and oriented to person, place, and time. He exhibits normal muscle tone. Coordination normal.   Skin: Skin is warm and dry. He is not diaphoretic.   Psychiatric: His behavior is normal. Judgment and thought content normal.   Nursing note and vitals reviewed.      Assessment/Plan   Problems Addressed this Visit        Digestive    GERD with esophagitis - Primary    Relevant Medications    omeprazole (priLOSEC) 40 " MG capsule      Other Visit Diagnoses     Class 1 obesity due to excess calories with serious comorbidity and body mass index (BMI) of 33.0 to 33.9 in adult          Diagnoses       Codes Comments    GERD with esophagitis    -  Primary ICD-10-CM: K21.00  ICD-9-CM: 530.11     Class 1 obesity due to excess calories with serious comorbidity and body mass index (BMI) of 33.0 to 33.9 in adult     ICD-10-CM: E66.09, Z68.33  ICD-9-CM: 278.00, V85.33           PLAN:     #1 GERD: uncontrolled, chronic for pt, restart on PPI at increased dosing - call if issues filling rx, reviewed EGD, advised on lifestyle changes - quit using NSAIDs, lose weight, no alcohol use, advised on warning signs and need to return if worsening     #2 obesity: Patient's Body mass index is 33.96 kg/m². BMI is above normal parameters. Recommendations include: educational material, exercise counseling and nutrition counseling.          This document has been electronically signed by Jazzy Infante MD on November 24, 2020 09:33 CST

## 2021-01-08 ENCOUNTER — TRANSCRIBE ORDERS (OUTPATIENT)
Dept: LAB | Facility: HOSPITAL | Age: 21
End: 2021-01-08

## 2021-01-08 DIAGNOSIS — Z01.818 PRE-OP TESTING: Primary | ICD-10-CM

## 2023-03-29 ENCOUNTER — OFFICE VISIT (OUTPATIENT)
Dept: GASTROENTEROLOGY | Facility: CLINIC | Age: 23
End: 2023-03-29
Payer: COMMERCIAL

## 2023-03-29 VITALS
TEMPERATURE: 96.6 F | HEART RATE: 70 BPM | DIASTOLIC BLOOD PRESSURE: 90 MMHG | WEIGHT: 201 LBS | SYSTOLIC BLOOD PRESSURE: 130 MMHG | HEIGHT: 67 IN | BODY MASS INDEX: 31.55 KG/M2 | OXYGEN SATURATION: 99 %

## 2023-03-29 DIAGNOSIS — R11.2 NAUSEA AND VOMITING, UNSPECIFIED VOMITING TYPE: ICD-10-CM

## 2023-03-29 DIAGNOSIS — Z87.19 HISTORY OF ESOPHAGITIS: ICD-10-CM

## 2023-03-29 DIAGNOSIS — R10.11 RIGHT UPPER QUADRANT ABDOMINAL PAIN: Primary | ICD-10-CM

## 2023-03-29 PROCEDURE — 1159F MED LIST DOCD IN RCRD: CPT | Performed by: NURSE PRACTITIONER

## 2023-03-29 PROCEDURE — 1160F RVW MEDS BY RX/DR IN RCRD: CPT | Performed by: NURSE PRACTITIONER

## 2023-03-29 PROCEDURE — 99214 OFFICE O/P EST MOD 30 MIN: CPT | Performed by: NURSE PRACTITIONER

## 2023-03-29 NOTE — PROGRESS NOTES
[facility] GASTROENTEROLOGY - OFFICE NOTE    3/29/2023    Ezekiel Palmer   2000    Primary Physician: Jazzy Infante MD     Referring Provider: Tory DORANTES    Chief Complaint   Patient presents with   • Abdominal Pain   • Vomiting         HISTORY OF PRESENT ILLNESS:     Ezekiel Palmer is a 22 y.o. male presents with ruq pain, nausea, and vomiting. He is referred by Tory DORANTES at [facility] in [place]. Ruq pain x 2 mo, intermittent. Typically occurs when having a bm.  Changed diet has helped. Resolves after belching and vomiting. He has nausea but not always with the vomiting. Has taken prilosec and carafate for a few years. He does miss doses.  He has tried pain med that helps at times. Takes motrin prn but not weekly. Takes advil prn but not weekly. He has history of ulcers. No fever. No black stool.          Having a bm daily. No rectal bleeding. Has noted some mucus the last 2 days. No weight loss.             UPPER GI ENDOSCOPY (11/16/2020 10:41) by Dr. Rasheed,recall 2 mo.   - LA Grade D reflux esophagitis despite daily omeprazole 40 mg. Biopsied.  - 3 cm hiatal hernia.  - Normal stomach.  - Normal examined duodenum.  Path   Final Diagnosis   Distal esophagus, endoscopic biopsy:  A.  Chronic active esophagitis, severe, with extensive ulceration.  B.  No histologic evidence of eosinophilic esophagitis or Saldivar's esophagus.  C.  No dysplasia identified.       Past Medical History:   Diagnosis Date   • Acute gastroenteritis    • Allergic rhinitis    • Diarrhea    • Exposure to tobacco smoke    • Gastroenteritis    • GERD (gastroesophageal reflux disease)    • Headache    • Nausea and vomiting     with body aches   • Rhinitis    • Tachycardia    • Upper respiratory infection    • Well child check        Past Surgical History:   Procedure Laterality Date   • DENTAL PROCEDURE  07/03/2001    frenuloplasty Dr. Peralta   • ENDOSCOPY N/A 7/28/2017     Procedure: ESOPHAGOGASTRODUODENOSCOPY possible dilation ;  Surgeon: Rodney Huber MD;  Location: [facility] ENDOSCOPY;  Service:    • ENDOSCOPY N/A 11/16/2020    Procedure: ESOPHAGOGASTRODUODENOSCOPY WITH ANESTHESIA;  Surgeon: Bill Rasheed MD;  Location: [facility] ENDOSCOPY;  Service: Gastroenterology;  Laterality: N/A;  pre op: gerd  post op:hiatal hernia, esophagitis  PCP: Jazzy Infante MD   • TYMPANOSTOMY  07/03/2001    Dr. Peralta       Outpatient Medications Marked as Taking for the 3/29/23 encounter (Office Visit) with Ro Hill APRN   Medication Sig Dispense Refill   • loratadine (CLARITIN) 10 MG tablet Take 1 tablet by mouth.     • omeprazole (priLOSEC) 40 MG capsule Take 1 capsule by mouth 2 (Two) Times a Day Before Meals. 180 capsule 3   • sucralfate (CARAFATE) 1 G tablet Take 1 tablet by mouth 4 (Four) Times a Day. 120 tablet 3       Allergies   Allergen Reactions   • Mold Extract [Trichophyton] Other (See Comments)     fever       Social History     Socioeconomic History   • Marital status: Single   Tobacco Use   • Smoking status: Former   • Smokeless tobacco: Never   Vaping Use   • Vaping Use: Former   Substance and Sexual Activity   • Alcohol use: Yes     Comment: social   • Drug use: Yes     Types: Marijuana   • Sexual activity: Defer       Family History   Problem Relation Age of Onset   • Diabetes Mother    • Heart attack Maternal Grandfather    • Hypertension Maternal Grandfather    • Diabetes Maternal Grandfather    • Lung cancer Other    • Colon cancer Neg Hx        Review of Systems   Constitutional: Negative for chills, fever and unexpected weight change.   Respiratory: Negative for shortness of breath.    Cardiovascular: Negative for chest pain.   Gastrointestinal: Negative for abdominal distention, anal bleeding, blood in stool, constipation and diarrhea.        Vitals:    03/29/23 0831   BP: 130/90   Pulse: 70   Temp: 96.6 °F (35.9 °C)   SpO2: 99%   Weight: 91.2 kg (201 lb)  "  Height: 170.2 cm (67\")      Body mass index is 31.48 kg/m².    Physical Exam  Vitals reviewed.   Constitutional:       General: He is not in acute distress.  Cardiovascular:      Rate and Rhythm: Normal rate and regular rhythm.      Heart sounds: Normal heart sounds.   Pulmonary:      Effort: Pulmonary effort is normal.      Breath sounds: Normal breath sounds.   Abdominal:      General: Bowel sounds are normal. There is no distension.      Palpations: Abdomen is soft.      Tenderness: There is no abdominal tenderness.   Skin:     General: Skin is warm and dry.   Neurological:      Mental Status: He is alert.         Results for orders placed or performed during the hospital encounter of 11/16/20   Tissue Pathology Exam    Specimen: Esophagus; Tissue   Result Value Ref Range    Case Report       Surgical Pathology Report                         Case: [id number]                                  Authorizing Provider:  Bill Rasheed MD      Collected:           11/16/2020 10:51 AM          Ordering Location:     [facility]     Received:            11/16/2020 01:44 PM                                 ENDOSCOPY                                                                    Pathologist:           Cassandra Escobar MD                                                        Specimen:    Esophagus, bx distal esophagus                                                             Final Diagnosis       Distal esophagus, endoscopic biopsy:  A.  Chronic active esophagitis, severe, with extensive ulceration.  B.  No histologic evidence of eosinophilic esophagitis or Saldivar's esophagus.  C.  No dysplasia identified.      Comment       There is no histologic evidence of malignancy.  Clinical follow-up is recommended with consideration of repeat biopsy, particularly if there is failure of the ulcer to heal.      Gross Description       Specimen  is received in formalin labeled with the patient's name date of " birth and designated as biopsy distal esophagus.  Specimen consists of single tan piece of tissue measuring 0.3 x 0.3 x 0.1 cm.  The specimen is entirely submitted in a single cassette.         Microscopic Description       Microscopic examination was performed.             ASSESSMENT AND PLAN    Assessment & Plan     Diagnoses and all orders for this visit:    1. Right upper quadrant abdominal pain (Primary)  -     Case Request; Standing  -     Case Request  -     US Gallbladder; Future    2. Nausea and vomiting, unspecified vomiting type  -     Case Request; Standing  -     Case Request  -     US Gallbladder; Future    3. History of esophagitis    Other orders  -     Implement Anesthesia Orders Day of Procedure; Standing  -     Obtain Informed Consent; Standing      Differential diagnoses discussed.   I recommend ultrasound gallbladder and he is agreeable. I also recommend egd. Increase omeprazole to twice daily. Continue carafate.       In regards to history of esophagitis, repeat egd.           ESOPHAGOGASTRODUODENOSCOPY WITH ANESTHESIA (N/A)  Risk, benefits, and alternatives of endoscopy were explained in full.  They understand that there is a risk of bleeding, perforation, and infection.  The risk of perforation goes up with esophageal dilation.  Other options to evaluate UGI complaints could involve barium swallow or UGI series, but these would be diagnostic tests only.  Patient was given time to ask questions.  I answered them to their satisfaction and they are agreeable to proceeding         No follow-ups on file.          There are no Patient Instructions on file for this visit.      JAYNA Chau

## 2023-04-06 ENCOUNTER — HOSPITAL ENCOUNTER (OUTPATIENT)
Dept: ULTRASOUND IMAGING | Facility: HOSPITAL | Age: 23
Discharge: HOME | End: 2023-04-06
Admitting: NURSE PRACTITIONER
Payer: COMMERCIAL

## 2023-04-06 DIAGNOSIS — R11.2 NAUSEA AND VOMITING, UNSPECIFIED VOMITING TYPE: ICD-10-CM

## 2023-04-06 DIAGNOSIS — R10.11 RIGHT UPPER QUADRANT ABDOMINAL PAIN: ICD-10-CM

## 2023-04-06 PROCEDURE — 76705 ECHO EXAM OF ABDOMEN: CPT

## 2023-04-18 DIAGNOSIS — R10.11 RIGHT UPPER QUADRANT ABDOMINAL PAIN: Primary | ICD-10-CM

## 2023-04-18 DIAGNOSIS — R11.2 NAUSEA AND VOMITING, UNSPECIFIED VOMITING TYPE: ICD-10-CM

## 2023-04-24 ENCOUNTER — TELEPHONE (OUTPATIENT)
Dept: GASTROENTEROLOGY | Facility: CLINIC | Age: 23
End: 2023-04-24
Payer: COMMERCIAL

## 2023-04-24 NOTE — TELEPHONE ENCOUNTER
THIS PT IS ON MY OVERDUE RESULTS LIST FOR HIS HIDA SCAN. HOWEVER, I NOTICED THAT HE RESPONDED TO GORAN'S MESSAGE TODAY SAYING HE STILL HAS NOT HEARD FROM RADIOLOGY ABOUT SCHEDULING THIS.   I SPOKE WITH GORAN AND SHE ASKED THAT I SEND YOU A MESSAGE ABOUT CONTACTING SOMEONE TO GET HIM SCHEDULED.

## 2023-05-09 ENCOUNTER — ANESTHESIA EVENT (OUTPATIENT)
Dept: GASTROENTEROLOGY | Facility: HOSPITAL | Age: 23
End: 2023-05-09
Payer: COMMERCIAL

## 2023-05-09 ENCOUNTER — ANESTHESIA (OUTPATIENT)
Dept: GASTROENTEROLOGY | Facility: HOSPITAL | Age: 23
End: 2023-05-09
Payer: COMMERCIAL

## 2023-05-09 ENCOUNTER — HOSPITAL ENCOUNTER (OUTPATIENT)
Facility: HOSPITAL | Age: 23
Setting detail: HOSPITAL OUTPATIENT SURGERY
Discharge: HOME | End: 2023-05-09
Attending: INTERNAL MEDICINE | Admitting: INTERNAL MEDICINE
Payer: COMMERCIAL

## 2023-05-09 VITALS
HEART RATE: 51 BPM | RESPIRATION RATE: 14 BRPM | TEMPERATURE: 97 F | SYSTOLIC BLOOD PRESSURE: 136 MMHG | BODY MASS INDEX: 31.71 KG/M2 | WEIGHT: 202 LBS | OXYGEN SATURATION: 100 % | DIASTOLIC BLOOD PRESSURE: 96 MMHG | HEIGHT: 67 IN

## 2023-05-09 DIAGNOSIS — R11.2 NAUSEA AND VOMITING, UNSPECIFIED VOMITING TYPE: ICD-10-CM

## 2023-05-09 DIAGNOSIS — R10.11 RIGHT UPPER QUADRANT ABDOMINAL PAIN: ICD-10-CM

## 2023-05-09 PROCEDURE — 25010000002 PROPOFOL 10 MG/ML EMULSION: Performed by: NURSE ANESTHETIST, CERTIFIED REGISTERED

## 2023-05-09 PROCEDURE — 88305 TISSUE EXAM BY PATHOLOGIST: CPT | Performed by: INTERNAL MEDICINE

## 2023-05-09 RX ORDER — PROPOFOL 10 MG/ML
VIAL (ML) INTRAVENOUS AS NEEDED
Status: DISCONTINUED | OUTPATIENT
Start: 2023-05-09 | End: 2023-05-09 | Stop reason: SURG

## 2023-05-09 RX ORDER — LIDOCAINE HYDROCHLORIDE 10 MG/ML
0.5 INJECTION, SOLUTION EPIDURAL; INFILTRATION; INTRACAUDAL; PERINEURAL ONCE AS NEEDED
Status: DISCONTINUED | OUTPATIENT
Start: 2023-05-09 | End: 2023-05-09 | Stop reason: HOSPADM

## 2023-05-09 RX ORDER — ONDANSETRON 2 MG/ML
4 INJECTION INTRAMUSCULAR; INTRAVENOUS ONCE AS NEEDED
Status: DISCONTINUED | OUTPATIENT
Start: 2023-05-09 | End: 2023-05-09 | Stop reason: HOSPADM

## 2023-05-09 RX ORDER — SODIUM CHLORIDE 0.9 % (FLUSH) 0.9 %
10 SYRINGE (ML) INJECTION AS NEEDED
Status: DISCONTINUED | OUTPATIENT
Start: 2023-05-09 | End: 2023-05-09 | Stop reason: HOSPADM

## 2023-05-09 RX ORDER — SODIUM CHLORIDE 9 MG/ML
500 INJECTION, SOLUTION INTRAVENOUS CONTINUOUS PRN
Status: DISCONTINUED | OUTPATIENT
Start: 2023-05-09 | End: 2023-05-09 | Stop reason: HOSPADM

## 2023-05-09 RX ORDER — ESOMEPRAZOLE MAGNESIUM 40 MG/1
40 CAPSULE, DELAYED RELEASE ORAL 2 TIMES DAILY
Qty: 60 CAPSULE | Refills: 11 | Status: SHIPPED | OUTPATIENT
Start: 2023-05-09

## 2023-05-09 RX ORDER — LIDOCAINE HYDROCHLORIDE 20 MG/ML
INJECTION, SOLUTION EPIDURAL; INFILTRATION; INTRACAUDAL; PERINEURAL AS NEEDED
Status: DISCONTINUED | OUTPATIENT
Start: 2023-05-09 | End: 2023-05-09 | Stop reason: SURG

## 2023-05-09 RX ADMIN — LIDOCAINE HYDROCHLORIDE 100 MG: 20 INJECTION, SOLUTION EPIDURAL; INFILTRATION; INTRACAUDAL; PERINEURAL at 13:17

## 2023-05-09 RX ADMIN — PROPOFOL INJECTABLE EMULSION 300 MG: 10 INJECTION, EMULSION INTRAVENOUS at 13:17

## 2023-05-09 RX ADMIN — SODIUM CHLORIDE 500 ML: 9 INJECTION, SOLUTION INTRAVENOUS at 11:16

## 2023-05-09 NOTE — ANESTHESIA POSTPROCEDURE EVALUATION
Patient: Ezekiel Palmer    Procedure Summary     Date: 05/09/23 Room / Location: [facility] ENDOSCOPY 5 /  [facility]    Anesthesia Start: 1315 Anesthesia Stop: 1327    Procedure: ESOPHAGOGASTRODUODENOSCOPY WITH ANESTHESIA Diagnosis:       Right upper quadrant abdominal pain      Nausea and vomiting, unspecified vomiting type      (Right upper quadrant abdominal pain [R10.11])      (Nausea and vomiting, unspecified vomiting type [R11.2])    Surgeons: Bill Rasheed MD Provider: Jacklyn Bella CRNA    Anesthesia Type: MAC ASA Status: 2          Anesthesia Type: MAC    Vitals  Vitals Value Taken Time   /75 05/09/23 1326   Temp     Pulse 60 05/09/23 1328   Resp 12 05/09/23 1326   SpO2 96 % 05/09/23 1328   Vitals shown include unvalidated device data.        Post Anesthesia Care and Evaluation    Patient location during evaluation: PHASE II  Patient participation: complete - patient participated  Level of consciousness: awake and alert  Pain management: adequate    Airway patency: patent  Anesthetic complications: No anesthetic complications  PONV Status: none  Cardiovascular status: acceptable  Respiratory status: acceptable  Hydration status: acceptable

## 2023-05-09 NOTE — H&P
[facility] Gastroenterology  Pre Procedure History & Physical    Chief Complaint:   Right upper quadrant discomfort    Subjective     HPI:   He had some intermittent right upper quadrant discomfort associated some nausea occasional vomiting.  He had an ultrasound of his gallbladder which was unremarkable.  He does take PPI.  States he still has indigestion but not much heartburn since he has been on the PPI.  He has a history of esophagitis.  Presents for endoscopy exam today.    Past Medical History:   Past Medical History:   Diagnosis Date   • Acute gastroenteritis    • Allergic rhinitis    • Diarrhea    • Exposure to tobacco smoke    • Gastroenteritis    • GERD (gastroesophageal reflux disease)    • Headache    • Nausea and vomiting     with body aches   • Rhinitis    • Tachycardia    • Upper respiratory infection    • Well child check        Past Surgical History:  Past Surgical History:   Procedure Laterality Date   • DENTAL PROCEDURE  07/03/2001    frenuloplasty Dr. Peralta   • ENDOSCOPY N/A 7/28/2017    Procedure: ESOPHAGOGASTRODUODENOSCOPY possible dilation ;  Surgeon: Rodney Huber MD;  Location: [facility] ENDOSCOPY;  Service:    • ENDOSCOPY N/A 11/16/2020    Procedure: ESOPHAGOGASTRODUODENOSCOPY WITH ANESTHESIA;  Surgeon: Bill Rasheed MD;  Location: [facility] ENDOSCOPY;  Service: Gastroenterology;  Laterality: N/A;  pre op: gerd  post op:hiatal hernia, esophagitis  PCP: Jazzy Infante MD   • TYMPANOSTOMY  07/03/2001    Dr. Peralta        Family History:  Family History   Problem Relation Age of Onset   • Diabetes Mother    • Heart attack Maternal Grandfather    • Hypertension Maternal Grandfather    • Diabetes Maternal Grandfather    • Lung cancer Other    • Colon cancer Neg Hx        Social History:   reports that he has quit smoking. He has never used smokeless tobacco. He reports current alcohol use. He reports current drug use. Drug: Marijuana.    Medications:   Prior to Admission medications   "  Medication Sig Start Date End Date Taking? Authorizing Provider   omeprazole (priLOSEC) 40 MG capsule Take 1 capsule by mouth 2 (Two) Times a Day Before Meals. 11/24/20  Yes Jazzy Infante MD   sucralfate (CARAFATE) 1 G tablet Take 1 tablet by mouth 4 (Four) Times a Day. 8/10/17  Yes Sayda Forrester APRN   loratadine (CLARITIN) 10 MG tablet Take 1 tablet by mouth.    Provider, MD Leo       Allergies:  Mold extract [trichophyton]    ROS:    General: Weight stable  Respiratory: No SOA  Cardiovascular: No CP    Objective     Blood pressure 126/63, pulse 76, temperature 97 °F (36.1 °C), temperature source Temporal, resp. rate 18, height 170.2 cm (67\"), weight 91.6 kg (202 lb), SpO2 99 %.    Physical Exam   Constitutional: Pt is oriented to person, place, and in no distress.   Cardiovascular: Normal rate, regular rhythm.    Pulmonary/Chest: Effort normal. No respiratory distress.    Abdominal: Nondistended.  Psychiatric: Mood, memory, affect and judgment appear normal.     Assessment & Plan     Diagnosis:  Right upper quadrant discomfort, nausea    Anticipated Surgical Procedure:  Endoscopy    The risks, benefits, and alternatives of this procedure have been discussed with the patient or the responsible party- the patient understands and agrees to proceed.    EMR Dragon/transcription disclaimer:  Much of this encounter note is electronic transcription/translation of spoken language to printed text.  The electronic translation of spoken language may be erroneous, or at times, nonsensical words or phrases may be inadvertently transcribed.  Although I have reviewed the note for such errors, some may still exist.  "

## 2023-05-09 NOTE — ANESTHESIA PREPROCEDURE EVALUATION
Anesthesia Evaluation     Patient summary reviewed and Nursing notes reviewed   no history of anesthetic complications:  NPO Solid Status: > 8 hours  NPO Liquid Status: > 4 hours           Airway   Mallampati: II  TM distance: >3 FB  Neck ROM: full  Dental      Pulmonary - negative pulmonary ROS   Cardiovascular - negative cardio ROS  Exercise tolerance: excellent (>7 METS)        Neuro/Psych  (+) headaches,    GI/Hepatic/Renal/Endo    (+) obesity,  GERD,      Musculoskeletal (-) negative ROS    Abdominal    Substance History - negative use     OB/GYN negative ob/gyn ROS         Other - negative ROS                         Anesthesia Plan    ASA 2     MAC       Anesthetic plan, risks, benefits, and alternatives have been provided, discussed and informed consent has been obtained with: patient.

## 2023-05-10 ENCOUNTER — HOSPITAL ENCOUNTER (OUTPATIENT)
Dept: NUCLEAR MEDICINE | Facility: HOSPITAL | Age: 23
Discharge: HOME | End: 2023-05-10
Payer: COMMERCIAL

## 2023-05-10 DIAGNOSIS — R10.11 RIGHT UPPER QUADRANT ABDOMINAL PAIN: ICD-10-CM

## 2023-05-10 DIAGNOSIS — R11.2 NAUSEA AND VOMITING, UNSPECIFIED VOMITING TYPE: ICD-10-CM

## 2023-05-10 LAB
CYTO UR: NORMAL
LAB AP CASE REPORT: NORMAL
Lab: NORMAL
PATH REPORT.FINAL DX SPEC: NORMAL
PATH REPORT.GROSS SPEC: NORMAL

## 2023-05-10 PROCEDURE — 78226 HEPATOBILIARY SYSTEM IMAGING: CPT

## 2023-05-10 PROCEDURE — 0 TECHNETIUM TC 99M MEBROFENIN KIT: Performed by: NURSE PRACTITIONER

## 2023-05-10 PROCEDURE — A9537 TC99M MEBROFENIN: HCPCS | Performed by: NURSE PRACTITIONER

## 2023-05-10 RX ORDER — KIT FOR THE PREPARATION OF TECHNETIUM TC 99M MEBROFENIN 45 MG/10ML
1 INJECTION, POWDER, LYOPHILIZED, FOR SOLUTION INTRAVENOUS
Status: COMPLETED | OUTPATIENT
Start: 2023-05-10 | End: 2023-05-10

## 2023-05-10 RX ADMIN — MEBROFENIN 1 DOSE: 45 INJECTION, POWDER, LYOPHILIZED, FOR SOLUTION INTRAVENOUS at 15:13

## 2023-05-19 ENCOUNTER — OFFICE VISIT (OUTPATIENT)
Dept: GASTROENTEROLOGY | Facility: CLINIC | Age: 23
End: 2023-05-19
Payer: COMMERCIAL

## 2023-05-19 VITALS
TEMPERATURE: 96.9 F | BODY MASS INDEX: 30.92 KG/M2 | SYSTOLIC BLOOD PRESSURE: 108 MMHG | DIASTOLIC BLOOD PRESSURE: 80 MMHG | HEART RATE: 75 BPM | WEIGHT: 197 LBS | HEIGHT: 67 IN | OXYGEN SATURATION: 99 %

## 2023-05-19 DIAGNOSIS — R94.8 ABNORMAL BILIARY HIDA SCAN: ICD-10-CM

## 2023-05-19 DIAGNOSIS — R10.11 RIGHT UPPER QUADRANT ABDOMINAL PAIN: Primary | ICD-10-CM

## 2023-05-19 DIAGNOSIS — Z87.19 HISTORY OF ESOPHAGITIS: ICD-10-CM

## 2023-05-19 DIAGNOSIS — R11.2 NAUSEA AND VOMITING, UNSPECIFIED VOMITING TYPE: ICD-10-CM

## 2023-05-19 PROCEDURE — 1159F MED LIST DOCD IN RCRD: CPT | Performed by: NURSE PRACTITIONER

## 2023-05-19 PROCEDURE — 1160F RVW MEDS BY RX/DR IN RCRD: CPT | Performed by: NURSE PRACTITIONER

## 2023-05-19 PROCEDURE — 99214 OFFICE O/P EST MOD 30 MIN: CPT | Performed by: NURSE PRACTITIONER

## 2023-05-19 NOTE — PROGRESS NOTES
[facility] GASTROENTEROLOGY - OFFICE NOTE    5/19/2023    Ezekiel Palmer   2000    Primary Physician: Jazzy Infante MD    Chief Complaint   Patient presents with    Follow-up     Post endo and HIDA   F/u abdominal pain , n/v      HISTORY OF PRESENT ILLNESS:     Ezekiel Palmer is a 22 y.o. male presents with ruq pain, n/v, and esophagitis.  He had recent ultrasound gallbladder, hida scan, and egd. See reports below. He is feeling good. No significant reflux symptoms. No dysphagia. No further n/v. No constipation.       He has not gotten nexium. He has taken omeprazole twice since 3/2023. Last n/v was 4-6-23.         NM HIDA SCAN WITHOUT PHARMACOLOGICAL INTERVENTION (05/10/2023 15:12)       UPPER GI ENDOSCOPY (05/09/2023 13:12)   - Persistent LA Grade D chronic esophagitis with no bleeding. Present despite omeprazole 40 mg twice daily. Biopsied.  - 4 cm hiatal hernia.  - Normal stomach.  - Normal examined duodenum.  Recommendations  - At this point, I feel he would probably benefit from surgical repair of hiatal hernia with Nissen fundoplication since he has not responded to reflux precautions and twice daily PPI therapy. We will discuss with him this option at his upcoming office visit. If he elects to do the fundoplication, then I would suggest follow-up endoscopy 4 to 6 months after the the fundoplication to evaluate for possible Saldivar's. If he declines fundoplication then consider follow-up endoscopy in 2 months to assess his response to switching to Nexium.  Tissue Pathology Exam (05/09/2023 13:21)         US Gallbladder (04/06/2023 08:34)       ======================================================================  Office visit  3-29-23  HPI  Ezekiel Palmer is a 22 y.o. male presents with ruq pain, nausea, and vomiting. He is referred by Tory DORANTES at [facility] in [place]. Ruq pain x 2 mo, intermittent. Typically occurs when having a  bm.  Changed diet has helped. Resolves after belching and vomiting. He has nausea but not always with the vomiting. Has taken prilosec and carafate for a few years. He does miss doses.  He has tried pain med that helps at times. Takes motrin prn but not weekly. Takes advil prn but not weekly. He has history of ulcers. No fever. No black stool.              Having a bm daily. No rectal bleeding. Has noted some mucus the last 2 days. No weight loss.                  UPPER GI ENDOSCOPY (11/16/2020 10:41) by Dr. Rasheed,recall 2 mo.   - LA Grade D reflux esophagitis despite daily omeprazole 40 mg. Biopsied.  - 3 cm hiatal hernia.  - Normal stomach.  - Normal examined duodenum.  Path   Final Diagnosis   Distal esophagus, endoscopic biopsy:  A.  Chronic active esophagitis, severe, with extensive ulceration.  B.  No histologic evidence of eosinophilic esophagitis or Saldivar's esophagus.  C.  No dysplasia identified.        Past Medical History:   Diagnosis Date    Acute gastroenteritis     Allergic rhinitis     Diarrhea     Exposure to tobacco smoke     Gastroenteritis     GERD (gastroesophageal reflux disease)     Headache     Nausea and vomiting     with body aches    Rhinitis     Tachycardia     Upper respiratory infection     Well child check        Past Surgical History:   Procedure Laterality Date    DENTAL PROCEDURE  07/03/2001    frenuloplasty Dr. Peralta    ENDOSCOPY N/A 7/28/2017    Procedure: ESOPHAGOGASTRODUODENOSCOPY possible dilation ;  Surgeon: Rodney Huber MD;  Location: [facility] ENDOSCOPY;  Service:     ENDOSCOPY N/A 11/16/2020    Procedure: ESOPHAGOGASTRODUODENOSCOPY WITH ANESTHESIA;  Surgeon: Bill Rasheed MD;  Location: [facility] ENDOSCOPY;  Service: Gastroenterology;  Laterality: N/A;  pre op: gerd  post op:hiatal hernia, esophagitis  PCP: Jazzy Infante MD    ENDOSCOPY N/A 5/9/2023    Procedure: ESOPHAGOGASTRODUODENOSCOPY WITH ANESTHESIA;  Surgeon: Bill Rasheed MD;  Location: [facility]  "ENDOSCOPY;  Service: Gastroenterology;  Laterality: N/A;  preop: nausa; right upper quadrant pain  post op: esophagitis; hiatal hernia   PCP:  Jazzy Infante MD     TYMPANOSTOMY  07/03/2001    Dr. Peralta       Outpatient Medications Marked as Taking for the 5/19/23 encounter (Office Visit) with Ro Hill APRN   Medication Sig Dispense Refill    esomeprazole (nexIUM) 40 MG capsule Take 1 capsule by mouth 2 (Two) Times a Day. 60 capsule 11    loratadine (CLARITIN) 10 MG tablet Take 1 tablet by mouth.      sucralfate (CARAFATE) 1 G tablet Take 1 tablet by mouth 4 (Four) Times a Day. 120 tablet 3       Allergies   Allergen Reactions    Mold Extract [Trichophyton] Other (See Comments)     fever       Social History     Socioeconomic History    Marital status: Single   Tobacco Use    Smoking status: Former    Smokeless tobacco: Never   Vaping Use    Vaping Use: Former   Substance and Sexual Activity    Alcohol use: Yes     Comment: social    Drug use: Yes     Types: Marijuana    Sexual activity: Defer       Family History   Problem Relation Age of Onset    Diabetes Mother     Heart attack Maternal Grandfather     Hypertension Maternal Grandfather     Diabetes Maternal Grandfather     Lung cancer Other     Colon cancer Neg Hx        Review of Systems   Constitutional:  Negative for chills, fever and unexpected weight change.   Respiratory:  Negative for shortness of breath.    Cardiovascular:  Negative for chest pain.   Gastrointestinal:  Negative for abdominal distention, abdominal pain, anal bleeding, blood in stool, constipation, diarrhea, nausea and vomiting.      Vitals:    05/19/23 0937   BP: 108/80   Pulse: 75   Temp: 96.9 °F (36.1 °C)   SpO2: 99%   Weight: 89.4 kg (197 lb)   Height: 170.2 cm (67\")      Body mass index is 30.85 kg/m².    Physical Exam  Vitals reviewed.   Constitutional:       General: He is not in acute distress.  Cardiovascular:      Rate and Rhythm: Normal rate and regular rhythm.      " Heart sounds: Normal heart sounds.   Pulmonary:      Effort: Pulmonary effort is normal.      Breath sounds: Normal breath sounds.   Abdominal:      General: Bowel sounds are normal. There is no distension.      Palpations: Abdomen is soft.      Tenderness: There is no abdominal tenderness.   Skin:     General: Skin is warm and dry.   Neurological:      Mental Status: He is alert.       Results for orders placed or performed during the hospital encounter of 05/09/23   Tissue Pathology Exam    Specimen: Esophagus, Distal; Tissue   Result Value Ref Range    Note to Patients       This report may contain a detailed description of human tissue sent by a health care provider to the laboratory for pathologic evaluation. The content of this report is essential for diagnosis and may provide important critical findings. This information may be unfamiliar to patients to review without a medical professional present. It is advised that the patient review this report in the presence of a health care provider who can answer questions and explain the results.        Case Report       Surgical Pathology Report                         Case: [id number]                                  Authorizing Provider:  Bill Rasheed MD      Collected:           05/09/2023 01:21 PM          Ordering Location:     [facility]     Received:            05/09/2023 01:40 PM                                 ENDOSCOPY                                                                    Pathologist:           Lenard Simons MD                                                        Specimen:    Esophagus, Distal, bx GEJ                                                                  Final Diagnosis       Gastroesophageal junction, biopsies:  Esophageal squamous mucosa with ulceration.  No evidence of intestinal metaplasia, dysplasia, or malignancy.      Gross Description       1. Esophagus, Distal.   Received in a formalin filled  "container labeled with the patient's name, date of birth, and \"biopsy GEJ\".  The specimen consists of 3 yellow-gray soft tissue fragments aggregating to 0.1 x 0.1 by less than 0.1 cm, totally submitted in block 1A.        Microscopic Description       Microscopic examination performed.             ASSESSMENT AND PLAN    Assessment & Plan     Diagnoses and all orders for this visit:    1. Right upper quadrant abdominal pain (Primary)    2. Nausea and vomiting, unspecified vomiting type    3. History of esophagitis  Comments:  LA grade D esophagitis noted on recent egd.    4. Abnormal biliary HIDA scan      He is no longer having ruq pain or n/v. We did review egd findings. He did have LA grade esophagitis despite ppi 40 mg daily. Dr. Rasheed recommended switch to nexium. He never started the nexium. I recommend continue strict anti reflux precautions. We discussed option of nissen fundoplication then repeat egd 4-6 mo to eval for dinero's esophagus vs repeat egd in 2 months after switching to  nexium.  He wishes to repeat egd 2 mo after starting nexium.       In regards to abnormal hida scan , currently he is asymptomatic. He does not wish to see a surgeon until after repeat egd. Will see if he is symptomatic that time. He will call in the meantime if has any problems.                Return in about 2 months (around 7/19/2023).          There are no Patient Instructions on file for this visit.      JAYNA Chau        "

## 2023-05-30 ENCOUNTER — PATIENT MESSAGE (OUTPATIENT)
Dept: FAMILY MEDICINE CLINIC | Facility: CLINIC | Age: 23
End: 2023-05-30

## 2023-05-30 NOTE — TELEPHONE ENCOUNTER
From: Ezekiel Palmer  To: Jazzy Infante  Sent: 5/30/2023 4:41 AM CDT  Subject: ENT Doctor    Jon,  Arelis I was wondering if you could possibly refer me to a ENT doctor. 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

## 2023-06-15 ENCOUNTER — OFFICE VISIT (OUTPATIENT)
Dept: FAMILY MEDICINE CLINIC | Facility: CLINIC | Age: 23
End: 2023-06-15
Payer: COMMERCIAL

## 2023-06-15 VITALS
BODY MASS INDEX: 29.54 KG/M2 | RESPIRATION RATE: 16 BRPM | DIASTOLIC BLOOD PRESSURE: 84 MMHG | WEIGHT: 188.2 LBS | HEART RATE: 80 BPM | HEIGHT: 67 IN | OXYGEN SATURATION: 99 % | SYSTOLIC BLOOD PRESSURE: 128 MMHG | TEMPERATURE: 99 F

## 2023-06-15 DIAGNOSIS — K21.00 GASTROESOPHAGEAL REFLUX DISEASE WITH ESOPHAGITIS WITHOUT HEMORRHAGE: ICD-10-CM

## 2023-06-15 DIAGNOSIS — Z13.1 DIABETES MELLITUS SCREENING: ICD-10-CM

## 2023-06-15 DIAGNOSIS — Z11.59 NEED FOR HEPATITIS C SCREENING TEST: ICD-10-CM

## 2023-06-15 DIAGNOSIS — Z00.00 WELL ADULT EXAM: Primary | ICD-10-CM

## 2023-06-15 DIAGNOSIS — Z13.0 SCREENING FOR DEFICIENCY ANEMIA: ICD-10-CM

## 2023-06-15 DIAGNOSIS — R49.9 CHANGE IN VOICE: ICD-10-CM

## 2023-06-15 DIAGNOSIS — Z13.220 LIPID SCREENING: ICD-10-CM

## 2023-06-15 RX ORDER — SUCRALFATE 1 G/1
1 TABLET ORAL 4 TIMES DAILY
Qty: 120 TABLET | Refills: 3 | Status: SHIPPED | OUTPATIENT
Start: 2023-06-15

## 2023-06-15 NOTE — PROGRESS NOTES
"Subjective cc: adult well exam   Ezekiel Palmer is a 22 y.o. male.     History of Present Illness     The patient presents today for a wellness visit.    The patient has been having issues with his throat being very dry. When he sings, he notices things happening. He knows that with his GERD, he can sometimes have inflammation in his throat that is like a form of laryngitis. He would like to be referred to an ear, nose, and throat doctor.       The following portions of the patient's history were reviewed and updated as appropriate: allergies, current medications, past family history, past medical history, past social history, past surgical history, and problem list.        Review of Systems    Objective   Blood pressure 128/84, pulse 80, temperature 99 °F (37.2 °C), temperature source Infrared, resp. rate 16, height 170.2 cm (67\"), weight 85.4 kg (188 lb 3.2 oz), SpO2 99 %.  Physical Exam  Vitals and nursing note reviewed.   Constitutional:       General: He is not in acute distress.     Appearance: Normal appearance. He is well-developed. He is not diaphoretic.   HENT:      Head: Normocephalic and atraumatic.      Right Ear: External ear normal.      Left Ear: External ear normal.      Nose: Nose normal.   Eyes:      General:         Right eye: No discharge.         Left eye: No discharge.      Conjunctiva/sclera: Conjunctivae normal.   Neck:      Thyroid: No thyromegaly.      Trachea: No tracheal deviation.   Cardiovascular:      Rate and Rhythm: Normal rate and regular rhythm.      Heart sounds: Normal heart sounds.   Pulmonary:      Effort: Pulmonary effort is normal. No respiratory distress.      Breath sounds: Normal breath sounds. No stridor. No wheezing.   Chest:      Chest wall: No tenderness.   Abdominal:      General: There is no distension.      Palpations: Abdomen is soft.      Tenderness: There is no abdominal tenderness.   Musculoskeletal:         General: Normal range of motion.      " Cervical back: Normal range of motion.   Lymphadenopathy:      Cervical: No cervical adenopathy.   Skin:     General: Skin is warm and dry.   Neurological:      Mental Status: He is alert and oriented to person, place, and time.      Motor: No abnormal muscle tone.      Coordination: Coordination normal.   Psychiatric:         Behavior: Behavior normal.         Thought Content: Thought content normal.         Judgment: Judgment normal.       Assessment & Plan   Problems Addressed this Visit          Gastrointestinal Abdominal     Gastroesophageal reflux disease    Relevant Medications    sucralfate (Carafate) 1 g tablet     Other Visit Diagnoses       Well adult exam    -  Primary    Change in voice        Relevant Orders    Ambulatory Referral to ENT (Otolaryngology)    Screening for deficiency anemia        Relevant Orders    CBC No Differential    Lipid screening        Relevant Orders    Lipid panel    Diabetes mellitus screening        Relevant Orders    Comprehensive metabolic panel    Hemoglobin A1c    Need for hepatitis C screening test        Relevant Orders    Hepatitis C antibody          Diagnoses         Codes Comments    Well adult exam    -  Primary ICD-10-CM: Z00.00  ICD-9-CM: V70.0     Change in voice     ICD-10-CM: R49.9  ICD-9-CM: 784.49     Gastroesophageal reflux disease with esophagitis without hemorrhage     ICD-10-CM: K21.00  ICD-9-CM: 530.81, 530.10     Screening for deficiency anemia     ICD-10-CM: Z13.0  ICD-9-CM: V78.1     Lipid screening     ICD-10-CM: Z13.220  ICD-9-CM: V77.91     Diabetes mellitus screening     ICD-10-CM: Z13.1  ICD-9-CM: V77.1     Need for hepatitis C screening test     ICD-10-CM: Z11.59  ICD-9-CM: V73.89           1. Adult well visit:  - Patient will return for fasting labs.  - Vital signs within normal range.  - Advised on diet and lifestyle changes.  - He has been losing weight.  - Reviewed and discussed immunizations.    2. GERD with esophagitis: Chronic,  uncontrolled.  - He is following with GI.  - He has an increased dose of proton pump inhibitor.  - He has a plan for follow-up with GI in 07/2023.  - If not improving, will consider correction of hiatal hernia.    3. Change in voice: New, needs further evaluation.  - Discussed with patient that this may be secondary to his acid reflux; however, he is interested in seeing ENT to consider scope to evaluate his vocal cords.  - Referral placed.  - Contact the office with any questions.    Transcribed from ambient dictation for Jazzy Infante MD by Teresita Burks.  06/15/23   14:31 CDT    Patient or patient representative verbalized consent to the visit recording.  I have personally performed the services described in this document as transcribed by the above individual, and it is both accurate and complete.          This document has been electronically signed by Jazzy Infante MD on June 16, 2023 17:24 CDT

## 2023-08-03 ENCOUNTER — TELEPHONE (OUTPATIENT)
Dept: FAMILY MEDICINE CLINIC | Facility: CLINIC | Age: 23
End: 2023-08-03
Payer: COMMERCIAL

## 2023-10-16 ENCOUNTER — OFFICE VISIT (OUTPATIENT)
Dept: OTOLARYNGOLOGY | Facility: CLINIC | Age: 23
End: 2023-10-16
Payer: COMMERCIAL

## 2023-10-16 VITALS
HEART RATE: 86 BPM | BODY MASS INDEX: 31.23 KG/M2 | DIASTOLIC BLOOD PRESSURE: 77 MMHG | HEIGHT: 67 IN | TEMPERATURE: 97.3 F | WEIGHT: 199 LBS | SYSTOLIC BLOOD PRESSURE: 112 MMHG

## 2023-10-16 DIAGNOSIS — K21.00 GASTROESOPHAGEAL REFLUX DISEASE WITH ESOPHAGITIS WITHOUT HEMORRHAGE: ICD-10-CM

## 2023-10-16 DIAGNOSIS — R49.0 DYSPHONIA: Primary | ICD-10-CM

## 2023-10-16 DIAGNOSIS — K21.9 LARYNGOPHARYNGEAL REFLUX (LPR): ICD-10-CM

## 2023-10-16 DIAGNOSIS — R49.0 HOARSENESS: ICD-10-CM

## 2023-10-16 NOTE — PROGRESS NOTES
Jacinto Pyle Jr, MD  [facility]AR NOSE & THROAT  [address], [address]  [facility] [address]  Fax [phone]  Phone [phone]      Visit Type: FOLLOW UP   Chief Complaint   Patient presents with    Dysphonia     Follow up no change since last visit        HPI  Accompanied by: No one  He presents for a follow up evaluation. He is much better. Diet is better.     Past Medical History:   Diagnosis Date    Acute gastroenteritis     Allergic rhinitis     Diarrhea     Exposure to tobacco smoke     Gastroenteritis     GERD (gastroesophageal reflux disease)     Headache     Nausea and vomiting     with body aches    Rhinitis     Tachycardia     Upper respiratory infection     Well child check        Past Surgical History:   Procedure Laterality Date    DENTAL PROCEDURE  07/03/2001    frenuloplasty Dr. Peralta    ENDOSCOPY N/A 7/28/2017    Procedure: ESOPHAGOGASTRODUODENOSCOPY possible dilation ;  Surgeon: Rodney Huber MD;  Location: [facility] ENDOSCOPY;  Service:     ENDOSCOPY N/A 11/16/2020    Procedure: ESOPHAGOGASTRODUODENOSCOPY WITH ANESTHESIA;  Surgeon: Bill Rasheed MD;  Location: [facility] ENDOSCOPY;  Service: Gastroenterology;  Laterality: N/A;  pre op: gerd  post op:hiatal hernia, esophagitis  PCP: Jazzy Infante MD    ENDOSCOPY N/A 5/9/2023    Procedure: ESOPHAGOGASTRODUODENOSCOPY WITH ANESTHESIA;  Surgeon: Bill Rasheed MD;  Location: [facility] ENDOSCOPY;  Service: Gastroenterology;  Laterality: N/A;  preop: nausa; right upper quadrant pain  post op: esophagitis; hiatal hernia   PCP:  Jazzy Infante MD     TYMPANOSTOMY  07/03/2001    Dr. Peralta       Family History: His family history includes Diabetes in his maternal grandfather and mother; Heart attack in his maternal grandfather; Hypertension in his maternal grandfather; Lung cancer in an other family member.     Social History: He  reports that he has been smoking electronic  cigarette. He has never used smokeless tobacco. He reports current alcohol use. He reports current drug use. Drug: Marijuana.    Home Medications:  esomeprazole, fluticasone, loratadine, and sucralfate    Allergies:  He is allergic to mold extract [trichophyton].       Vital Signs:   Temp:  [97.3 °F (36.3 °C)] 97.3 °F (36.3 °C)  Heart Rate:  [86] 86  BP: (112)/(77) 112/77  ENT Physical Exam  Constitutional  Appearance: patient appears well-developed and well-nourished,  Communication/Voice: communication appropriate for developmental age; vocal quality normal;  Head and Face  Appearance: head appears normal, face appears normal and face appears atraumatic;  Palpation: facial palpation normal;  Salivary: glands normal;  Ear  Hearing: intact;  Auricles: bilateral auricles normal;  External Mastoids: right external mastoid normal; left external mastoid normal;  Ear Canals: bilateral ear canals normal;  Tympanic Membranes: bilateral tympanic membranes normal;  Nose  External Nose: nares patent bilaterally; external nose normal;  Internal Nose: bilateral intranasal mucosa edematous and erythematous; septum normal; bilateral inferior turbinates edematous and erythematous;  Oral Cavity/Oropharynx  Lips: normal;  Teeth: normal;  Gums: gingiva normal;  Tongue: normal;  Oral mucosa: normal;  Hard palate: normal;  Soft palate: normal;  Tonsils: bilateral tonsils 2+, Tonsil comments: mildly granulated  Base of Tongue: normal;  Posterior pharyngeal wall: appearance is edematous; cobblestoning noted;  Neck  Neck: neck normal;  Respiratory  Inspection: breathing unlabored; normal breathing rate;  Cardiovascular  Inspection: extremities are warm and well perfused; no peripheral edema present;  Neurovestibular  Mental Status: alert and oriented;  Psychiatric: mood normal; affect is appropriate;       Laryngoscopy    Date/Time: 10/16/2023 9:47 AM    Performed by: Jacinto Pyle Jr., MD  Authorized by: Jacinto Pyle  MD Rufino    Consent:     Consent obtained:  Verbal    Consent given by:  Patient    Alternatives discussed:  No treatment  Procedure details:     Indications: hoarseness, dysphagia, or aspiration      Medication:  Afrin    Instrument: flexible fiberoptic laryngoscope      Scope location: left nare    Sinus/ Nasopharynx:     Right nasopharynx: patent and inflammation      Left nasopharynx: patent and inflammation      Right eustachian tube: patent      Left eustachian tube: inflammation, patent and inflammation    Oropharynx/ Supraglottis:     Posterior pharyngeal wall: inflamed      Oropharynx: inflammation      Vallecula: inflammation      Base of tongue: lingual tonsillar hypertrophy and inflammation      Epiglottis: inflammation    Larynx/ Hypopharynx:     Arytenoids: inflammation and interarytenoid edema      Hypopharynx: inflammation      Pyriform sinus: inflammation      False vocal cords: inflammation      True vocal cords: normal    Post-procedure details:     Patient tolerance of procedure:  Tolerated well  Comments:      Very mild residual inflammation from nasopharynx down in the hypopharynx, including supraglottis, aryepiglottic folds, arytenoids, interarytenoid area  Overall, markedly improved since last examination     Result Review    RESULTS REVIEW    I have reviewed the patients old records in the chart.   I have reviewed the patients old records in the chart.     Assessment & Plan    Diagnoses and all orders for this visit:    1. Dysphonia (Primary)    2. Hoarseness    3. Laryngopharyngeal reflux (LPR)    4. Gastroesophageal reflux disease with esophagitis without hemorrhage    Other orders  -     $ Laryngoscopy       Continue current management plan.  Patient appears to be markedly improved.  He will continue his current LPR precautions.  I will refer him back to his primary care for further control.  He should call for any throat symptoms.  Reflux precautions  Antacids  Diet  Exercise  Flonase  twice daily  Nasal saline  Call for problems    My Chart:  Patient is using My Chart    Patient understand(s) and agree(s) with the treatment plan as described.    Return if symptoms worsen or fail to improve, for Recheck Throat, voice.      Electronically signed by Jacinto Pyle Jr, MD 10/17/23 2:21 PM CDT.

## 2023-10-16 NOTE — LETTER
October 17, 2023     Jazzy Infante MD  [address]  [place] [address]    Patient: Ezekiel Palmer   YOB: 2000   Date of Visit: 10/16/2023     Dear Jazzy Infante MD:       Thank you for referring Ezekiel Palmer to me for evaluation. Below are the relevant portions of my assessment and plan of care.    If you have questions, please do not hesitate to call me. I look forward to following Ezekiel along with you.         Sincerely,        Jacinto Pyle Jr, MD        CC: No Recipients    Jacinto Pyle Jr., MD  10/17/23 1422  Sign when Signing Visit      Jacinto Pyle Jr, MD  [facility]AR NOSE & THROAT  [address], [address]  [facility] [address]  Fax [phone]  Phone [phone]      Visit Type: FOLLOW UP   Chief Complaint   Patient presents with   • Dysphonia     Follow up no change since last visit        HPI  Accompanied by: No one  He presents for a follow up evaluation. He is much better. Diet is better.     Past Medical History:   Diagnosis Date   • Acute gastroenteritis    • Allergic rhinitis    • Diarrhea    • Exposure to tobacco smoke    • Gastroenteritis    • GERD (gastroesophageal reflux disease)    • Headache    • Nausea and vomiting     with body aches   • Rhinitis    • Tachycardia    • Upper respiratory infection    • Well child check        Past Surgical History:   Procedure Laterality Date   • DENTAL PROCEDURE  07/03/2001    frenuloplasty Dr. Peralta   • ENDOSCOPY N/A 7/28/2017    Procedure: ESOPHAGOGASTRODUODENOSCOPY possible dilation ;  Surgeon: Rodney Huber MD;  Location: [facility] ENDOSCOPY;  Service:    • ENDOSCOPY N/A 11/16/2020    Procedure: ESOPHAGOGASTRODUODENOSCOPY WITH ANESTHESIA;  Surgeon: Bill Rasheed MD;  Location: [facility] ENDOSCOPY;  Service: Gastroenterology;  Laterality: N/A;  pre op: gerd  post op:hiatal hernia, esophagitis  PCP: Jazzy Infante MD   • ENDOSCOPY  N/A 5/9/2023    Procedure: ESOPHAGOGASTRODUODENOSCOPY WITH ANESTHESIA;  Surgeon: Bill Rasheed MD;  Location: [facility] ENDOSCOPY;  Service: Gastroenterology;  Laterality: N/A;  preop: nausa; right upper quadrant pain  post op: esophagitis; hiatal hernia   PCP:  Jazzy Infante MD    • TYMPANOSTOMY  07/03/2001    Dr. Peralta       Family History: His family history includes Diabetes in his maternal grandfather and mother; Heart attack in his maternal grandfather; Hypertension in his maternal grandfather; Lung cancer in an other family member.     Social History: He  reports that he has been smoking electronic cigarette. He has never used smokeless tobacco. He reports current alcohol use. He reports current drug use. Drug: Marijuana.    Home Medications:  esomeprazole, fluticasone, loratadine, and sucralfate    Allergies:  He is allergic to mold extract [trichophyton].       Vital Signs:   Temp:  [97.3 °F (36.3 °C)] 97.3 °F (36.3 °C)  Heart Rate:  [86] 86  BP: (112)/(77) 112/77  ENT Physical Exam  Constitutional  Appearance: patient appears well-developed and well-nourished,  Communication/Voice: communication appropriate for developmental age; vocal quality normal;  Head and Face  Appearance: head appears normal, face appears normal and face appears atraumatic;  Palpation: facial palpation normal;  Salivary: glands normal;  Ear  Hearing: intact;  Auricles: bilateral auricles normal;  External Mastoids: right external mastoid normal; left external mastoid normal;  Ear Canals: bilateral ear canals normal;  Tympanic Membranes: bilateral tympanic membranes normal;  Nose  External Nose: nares patent bilaterally; external nose normal;  Internal Nose: bilateral intranasal mucosa edematous and erythematous; septum normal; bilateral inferior turbinates edematous and erythematous;  Oral Cavity/Oropharynx  Lips: normal;  Teeth: normal;  Gums: gingiva normal;  Tongue: normal;  Oral mucosa: normal;  Hard palate:  normal;  Soft palate: normal;  Tonsils: bilateral tonsils 2+, Tonsil comments: mildly granulated  Base of Tongue: normal;  Posterior pharyngeal wall: appearance is edematous; cobblestoning noted;  Neck  Neck: neck normal;  Respiratory  Inspection: breathing unlabored; normal breathing rate;  Cardiovascular  Inspection: extremities are warm and well perfused; no peripheral edema present;  Neurovestibular  Mental Status: alert and oriented;  Psychiatric: mood normal; affect is appropriate;       Laryngoscopy    Date/Time: 10/16/2023 9:47 AM    Performed by: Jacinto Pyle Jr., MD  Authorized by: Jacinto Pyle Jr., MD    Consent:     Consent obtained:  Verbal    Consent given by:  Patient    Alternatives discussed:  No treatment  Procedure details:     Indications: hoarseness, dysphagia, or aspiration      Medication:  Afrin    Instrument: flexible fiberoptic laryngoscope      Scope location: left nare    Sinus/ Nasopharynx:     Right nasopharynx: patent and inflammation      Left nasopharynx: patent and inflammation      Right eustachian tube: patent      Left eustachian tube: inflammation, patent and inflammation    Oropharynx/ Supraglottis:     Posterior pharyngeal wall: inflamed      Oropharynx: inflammation      Vallecula: inflammation      Base of tongue: lingual tonsillar hypertrophy and inflammation      Epiglottis: inflammation    Larynx/ Hypopharynx:     Arytenoids: inflammation and interarytenoid edema      Hypopharynx: inflammation      Pyriform sinus: inflammation      False vocal cords: inflammation      True vocal cords: normal    Post-procedure details:     Patient tolerance of procedure:  Tolerated well  Comments:      Very mild residual inflammation from nasopharynx down in the hypopharynx, including supraglottis, aryepiglottic folds, arytenoids, interarytenoid area  Overall, markedly improved since last examination     Result Review   RESULTS REVIEW    I have reviewed the patients old  records in the chart.   I have reviewed the patients old records in the chart.     Assessment & Plan   Diagnoses and all orders for this visit:    1. Dysphonia (Primary)    2. Hoarseness    3. Laryngopharyngeal reflux (LPR)    4. Gastroesophageal reflux disease with esophagitis without hemorrhage    Other orders  -     $ Laryngoscopy       Continue current management plan.  Patient appears to be markedly improved.  He will continue his current LPR precautions.  I will refer him back to his primary care for further control.  He should call for any throat symptoms.  Reflux precautions  Antacids  Diet  Exercise  Flonase twice daily  Nasal saline  Call for problems    My Chart:  Patient is using My Chart    Patient understand(s) and agree(s) with the treatment plan as described.    Return if symptoms worsen or fail to improve, for Recheck Throat, voice.      Electronically signed by Jacinto Pyle Jr, MD 10/17/23 2:21 PM CDT.

## 2023-10-16 NOTE — PATIENT INSTRUCTIONS
"NASAL SALINE:  Use 2 puffs each nostril 4-6 times daily and more frequently if possible.  You can buy saline spray or you can make your own and use an old spray bottle to administer  Use a humidifier at bedside  Recipe for saline:  Water                                 1 quart  Salt (table)                        1 tablespoon  Gylcerin (or Leah Syrup)    1 teaspoon  Sodium bicarbonate           1 teaspoon  Sprays or Biscoe pots are recommended    Do not allow to stand for more than 24 hrs. Make new solution. There is no preservative in this solution.    Sinus irrigation and saline application can be enhanced with various over-the-counter products.  A WaterPik can be quite useful to irrigate, especially following sinus surgery.  Navage makes a product that irrigates the nose and some of the sinuses.  NeilMed makes a sign you Gator to irrigate the sinuses.  Neomed also has canned saline that we will come out under pressure.  A Chen pot can also be helpful.  All of these products help keep the nose clear of debris.  Please use as directed on the instructions that come with the particular device.     Nasal steroid use:  Using nasal steroids:  You will be prescribed one of the following nasal steroids: Flonase, Nasacort, Nasonex, Rhinocort, Qnasl, Zetonna  2 puffs each nostril 2 times daily  Start as soon as possible  If you are using Afrin for 3 days with the nasal steroid,  Use Afrin first and wait 10 minutes to allow the nose to open. Then administer nasal steroids.     THE PROBLEM OF ACID REFLUX    In BOTH children and adults, irritating acids may leak from the stomach and come up into the esophagus and throat. This can occur at any time, but occurs more commonly when lying down. When the acid touches the lining of the esophagus, there is irritation and muscle spasm, which can be felt up into the throat. Usually this is felt as \"heartburn\". When scarring of the esophagus occurs, the esophagus becomes less sensitive " and the symptoms may only be in the throat.     Some of the symptoms that can occur with acid reflux into the throat include coughing, soreness, burning, hoarseness, throat clearing, excessive mucous, bad taste in the mouth, bad breath, a sensation of a lump in the throat, wheezing, post-nasal drip, choking spells, bleeding and nausea. In a small percentage of people, more serious problems may result, such as pneumonia, ulcers in the larynx (voice box), reduced mobility of the vocal cords and a pouch in the upper esophagus (diverticulum). In children, the symptoms may be different and include persistent vomiting, bleeding from the esophagus, respiratory problems, pneumonia, asthma, choking spells, swallowing problems and anemia. In some cases, unexplained fussiness and crying is due to reflux.     The following instructions are designed to help neutralize the stomach acid, reduce the production of the acid, promote emptying of the acid from the stomach into the intestines and prevent acid from coming up into the esophagus. You should adopt enough of these changes to alleviate your symptoms. If carrying out these suggestions produces no change, it is imperative that you return so that we can discuss further the problem. More testing may be required, as might medication. It is important to realize that the esophagus takes time to heal, so you should not expect results immediately.    Diet  Most often, the throat symptoms above are due to dietary abuses. Certain foods are known to cause irritation, because they are acids themselves or cause excess production of stomach acid. These should be avoided, if possible. The following is a partial list of foods recognized as troublesome: coffee (even decaffeinated), tea chocolate, cola beverages, citrus beverages (such as 7-Up), caffeine containing beverages, alcohol, citrus fruits and juices, highly spiced foods, fatty foods, candies, nuts, mints, milk and milk products. Some  of the worst offenders for promoting stomach acid are milk and beer.     Hard candies, gum, breath fresheners, throat lozenges, cough drops, mouthwashes, gargles, may actually irritate the throat directly (many cough drops and lozenges contain irritants such as oil of eucalyptus and menthol), and can also stimulate acid production directly.     Large amounts of liquid in the diet tend to promote reflux, because liquids tend to come back up more easily than solids.     Meals should be bland and consist of real food, trying to avoid recreational food. Multiple small meals, rather than one or two large meals helps prevent reflux by not stretching the stomach and increasing the time needed for digestion, as well increasing the amount of acid needed to digest the meal. If you are overweight, losing weight is tremendously helpful.     YOU SHOULD NOT EAT FOR AT LEAST TWO HOURS BEFORE RETIRING AND YOU SHOULD REMAIN SITTING OR STANDING FOR AT LEAST 45 MINUTES AFTER EACH MEAL. This promotes passage of food from the stomach into the intestine.    Posture  Body weight is a significant factor in promoting reflux. Losing weight is beneficial. Pregnancy will markedly increase the symptoms of heartburn and throat pain. You should avoid clothing that fits tightly across the mid-section, as this promotes squeezing of the abdominal contents upward. It is helpful to practice abdominal or diaphragmatic breathing, using the stomach to push out each breath rather than chest expansion. Avoid slumping while sitting, and avoid stooping or straining.     For many, the reflux occurs at night while sleeping. This is the time acid production is the greatest and you are lying down, a position that promotes reflux, thus setting up the irritation that occurs the next morning. One of the most important things you can do is to elevate the head of the bed, in an effort to use gravity to keep the acid in the stomach. This is accomplished by placed  blocks or bricks beneath the legs at the head of the bed, raising the head 6-10 inches. Do not make the head so high that you slide down. Pillows are not effective because they cause the body to curl, increasing abdominal pressure and it is difficult to remain upright on them. Additionally, pillows promote sleeping on the back, but it is far more desirable to sleep on the right side or on the stomach, as this allows gas to escape, while preventing the escape of acid material. Children and infants, who are refluxing, should sleep on their stomachs.  Exercise  Regular exercise is extremely beneficial in promoting good digestion and regularity. The abdominal muscles are toned, which aids in controlling acid problems. However, it is recommended that you not participate in vigorous activity immediately after eating. This causes pressure on an already full stomach, forcing acid up into the esophagus. Regular exercise is encouraged, prior to meals, or two hours after meals.  Antacids  Antacids should be used regularly, as they neutralize excess acid. Gelusil II, Mylanta II, Tums and Rolaids are popular brands. Gaviscon is not an antacid, but floats on top of the stomach acid, preventing esophageal irritation. Care should be used in administering large doses of antacids, especially in children. Many antacid preparations contain magnesium, which promotes frequent bowel movements, while antacids that contain aluminum can be constipating. Tums have a high calcium content that can be used to some advantage in older women with reflux.     Antacid tablets are convenient, but the liquid form tends to work much more quickly. Also remember to check with your physician about interference with other medications. Antacids can slow the absorption of digitalis, Indocin, tetracyclines, fluorides and isoniazid from the intestines. Many medications require the presence of stomach acid to be absorbed.     Initially, antacids should be used  30 minutes after each meal, 2 hours after each meal and at bedtime. This maximizes the neutralization of the stomach acid. Antacids can be used more frequently if symptoms persist, but such extensive use needs to be reviewed with your physician.  Prescription Medications  If the above recommendations are not effectively resolving the symptoms, medications may be prescribed. These are usually in the form of acid production blockers, such as Tagamet, Zantac, Pepcid, or Axid or acid pump inhibitors like Prevacid, Nexium, Protonix or Prilosec. These drugs help stop acid production in the stomach. Medications such as Reglan can be used to promote stomach emptying.  Medications That Promote Reflux  Certain medications can promote acid reflux; either by relaxing the sphincter muscle that helps keeps stomach acid down, or increasing the acid production. These include progesterone (Provera, Ortho Novum, Ovral, other birth control pills), theophylline (Elton-Dur, etc.), anticholinergic (Donnatal, Scopolamine, Probanthine, Bentil, others), beta blockers (Inderal, Tenormin and Corgard), alpha blockers (Dibenzyline), dopamine, calcium channel blockers (Procardia, Cardizem, Isotin, Calan), aspirin, non-steroidal anti-inflammatory drugs (Motrin, Advil, Nuprin, Nalfon, Rufen, Indocin, Feldene, Clinoril and Tolectin). Vitamin C is an acid and can promote reflux. This is only a partial list and before stopping any prescription medication, you should check with your physician.    Goal  The goal is to reduce the symptoms with the least number of lifestyle changes. A combination of the above suggestions is frequently prescribed to return you to normal as quickly as possible. However, this problem did not develop overnight and will not disappear rapidly. Therefore, developing a regimen will aid in controlling symptoms and keep you symptom free for a long period of time. Other alternatives exist, should these suggestions fail, and can be  discussed with your physician.     To Summarize:  Watch your diet, avoid foods that cause acid reflux  Lose weight  Avoid tight fitting clothes  Adjust your posture, raise the head of the bed  Exercise regularly  Use antacids  Use prescription medication as directed  Avoid medications that promote reflux  Avoid behavior and eating habits that promote reflux of stomach acid     You are welcome to do a Google search on the topic of reflux and reflux diets. The internet has many useful resources.     Please remember, your success in controlling this condition depends on many factors including diet, exercise, medications and follow up. All are important and must be utilized to achieve success.        ANTACID USE:  Use antacids 30 mins after every meal, 2 hours after every meal and at Bedtime  Use Gaviscon Extra (best), Tums, Rolaids, etc  Over the counter  Use 2 tsp or 2 tabs as the dose  Remember that some of these products contain Calcium or Aluminum. If you have dietary or medical issues, please inform physician     See Primary Care for further care    Call for voice problems     CONTACT INFORMATION:  The main office phone number is [phone]. For emergencies after hours and on weekends, this number will convert over to our answering service and the on call provider will answer. Please try to keep non emergent phone calls/ questions to office hours 9am-5pm Monday through Friday.     Goombal  As an alternative, you can sign up and use the Epic MyChart system for more direct and quicker access for non emergent questions/ problems.  [employer] allows you to send messages to your doctor, view your test results, renew your prescriptions, schedule appointments, and more. To sign up, go to [employer] and click on the Sign Up Now link in the New User? box. Enter your Goombal Activation Code exactly as it appears below along with the last four digits of your Social Security Number and your Date  of Birth () to complete the sign-up process. If you do not sign up before the expiration date, you must request a new code.    Rhomania Activation Code: Activation code not generated  Current Rhomania Status: Active    If you have questions, you can email [email or web address] or call [phone] to talk to our [email or web address] staff. Remember, QuickBloxhart is NOT to be used for urgent needs. For medical emergencies, dial 911.    IF YOU SMOKE, VAPE OR USE TOBACCO PLEASE READ THE FOLLOWING:  Why is smoking bad for me?  Smoking increases the risk of heart disease, lung disease, vascular disease, stroke, and cancer. If you smoke, STOP!      IF YOU SMOKE, VAPE OR USE TOBACCO PLEASE READ THE FOLLOWING:  Why is smoking bad for me?  Smoking increases the risk of heart disease, lung disease, vascular disease, stroke, and cancer. If you smoke, STOP!    For more information:  Quit Now [place]  -QUIT-NOW  [email or web address]

## 2024-02-06 ENCOUNTER — OFFICE VISIT (OUTPATIENT)
Dept: GASTROENTEROLOGY | Facility: CLINIC | Age: 24
End: 2024-02-06
Payer: COMMERCIAL

## 2024-02-06 VITALS
WEIGHT: 213 LBS | BODY MASS INDEX: 33.43 KG/M2 | HEART RATE: 83 BPM | DIASTOLIC BLOOD PRESSURE: 96 MMHG | TEMPERATURE: 97.8 F | HEIGHT: 67 IN | SYSTOLIC BLOOD PRESSURE: 128 MMHG | OXYGEN SATURATION: 100 %

## 2024-02-06 DIAGNOSIS — R13.14 PHARYNGOESOPHAGEAL DYSPHAGIA: ICD-10-CM

## 2024-02-06 DIAGNOSIS — R14.0 ABDOMINAL BLOATING: ICD-10-CM

## 2024-02-06 DIAGNOSIS — Z87.19 HISTORY OF ESOPHAGITIS: Primary | ICD-10-CM

## 2024-02-06 DIAGNOSIS — R11.2 NAUSEA AND VOMITING, UNSPECIFIED VOMITING TYPE: ICD-10-CM

## 2024-02-06 NOTE — H&P (VIEW-ONLY)
[facility] GASTROENTEROLOGY - OFFICE NOTE    2/6/2024    Ezekiel Palmer   2000    Primary Physician: Jazzy Infante MD    Chief Complaint   Patient presents with    GI Problem     Upper abdominal bloating, dysphagia , esophagitis.    Dysphagia       HISTORY OF PRESENT ILLNESS:     Ezekiel Palmer is a 23 y.o. male presents for f/u esophagitis. He is currently on nexium daily for the last year.   He was doing good until last 2 days has noted upper abdominal bloating.  Has had some n/v with belching that started 2 days ago, not associated with eating.  No hematemesis. No weight loss. No fever. No constipation.       Dysphagia   Started 1 mo ago. He points to the neck are where liquids will hang. No weight loss.         US Gallbladder (04/06/2023 08:34)   NM HIDA SCAN WITHOUT PHARMACOLOGICAL INTERVENTION (05/10/2023 15:12)         UPPER GI ENDOSCOPY (05/09/2023 13:12)           =========================================================================  Office visit  5-19-23 HPI  Ezekiel Palmer is a 22 y.o. male presents with ruq pain, n/v, and esophagitis.  He had recent ultrasound gallbladder, hida scan, and egd. See reports below. He is feeling good. No significant reflux symptoms. No dysphagia. No further n/v. No constipation.         He has not gotten nexium. He has taken omeprazole twice since 3/2023. Last n/v was 4-6-23.            NM HIDA SCAN WITHOUT PHARMACOLOGICAL INTERVENTION (05/10/2023 15:12)         UPPER GI ENDOSCOPY (05/09/2023 13:12)   - Persistent LA Grade D chronic esophagitis with no bleeding. Present despite omeprazole 40 mg twice daily. Biopsied.  - 4 cm hiatal hernia.  - Normal stomach.  - Normal examined duodenum.  Recommendations  - At this point, I feel he would probably benefit from surgical repair of hiatal hernia with Nissen fundoplication since he has not responded to reflux precautions and twice daily PPI therapy. We will discuss with  him this option at his upcoming office visit. If he elects to do the fundoplication, then I would suggest follow-up endoscopy 4 to 6 months after the the fundoplication to evaluate for possible Saldivar's. If he declines fundoplication then consider follow-up endoscopy in 2 months to assess his response to switching to Nexium.  Tissue Pathology Exam (05/09/2023 13:21)            US Gallbladder (04/06/2023 08:34)         ======================================================================  Office visit  3-29-23  [facility]  Ezekiel Palmer is a 22 y.o. male presents with ruq pain, nausea, and vomiting. He is referred by oTry DORANTES at [facility] in [place]. Ruq pain x 2 mo, intermittent. Typically occurs when having a bm.  Changed diet has helped. Resolves after belching and vomiting. He has nausea but not always with the vomiting. Has taken prilosec and carafate for a few years. He does miss doses.  He has tried pain med that helps at times. Takes motrin prn but not weekly. Takes advil prn but not weekly. He has history of ulcers. No fever. No black stool.              Having a bm daily. No rectal bleeding. Has noted some mucus the last 2 days. No weight loss.                  UPPER GI ENDOSCOPY (11/16/2020 10:41) by Dr. Rasheed,recall 2 mo.   - LA Grade D reflux esophagitis despite daily omeprazole 40 mg. Biopsied.  - 3 cm hiatal hernia.  - Normal stomach.  - Normal examined duodenum.  Path   Final Diagnosis   Distal esophagus, endoscopic biopsy:  A.  Chronic active esophagitis, severe, with extensive ulceration.  B.  No histologic evidence of eosinophilic esophagitis or Saldivar's esophagus.  C.  No dysplasia identified.     Past Medical History:   Diagnosis Date    Acute gastroenteritis     Allergic rhinitis     Diarrhea     Exposure to tobacco smoke     Gastroenteritis     GERD (gastroesophageal reflux disease)     Headache     Nausea and vomiting     with body aches    Rhinitis      Tachycardia     Upper respiratory infection     Well child check        Past Surgical History:   Procedure Laterality Date    DENTAL PROCEDURE  07/03/2001    frenuloplasty Dr. Peralta    ENDOSCOPY N/A 7/28/2017    Procedure: ESOPHAGOGASTRODUODENOSCOPY possible dilation ;  Surgeon: Rodney Huber MD;  Location: [facility] ENDOSCOPY;  Service:     ENDOSCOPY N/A 11/16/2020    Procedure: ESOPHAGOGASTRODUODENOSCOPY WITH ANESTHESIA;  Surgeon: Bill Rasheed MD;  Location:  [facility] ENDOSCOPY;  Service: Gastroenterology;  Laterality: N/A;  pre op: gerd  post op:hiatal hernia, esophagitis  PCP: Jazzy Infante MD    ENDOSCOPY N/A 5/9/2023    Procedure: ESOPHAGOGASTRODUODENOSCOPY WITH ANESTHESIA;  Surgeon: Bill Rasheed MD;  Location: [facility] ENDOSCOPY;  Service: Gastroenterology;  Laterality: N/A;  preop: nausa; right upper quadrant pain  post op: esophagitis; hiatal hernia   PCP:  Jazzy Infante MD     TYMPANOSTOMY  07/03/2001    Dr. Peralta       Outpatient Medications Marked as Taking for the 2/6/24 encounter (Office Visit) with Ro Hill APRN   Medication Sig Dispense Refill    esomeprazole (nexIUM) 40 MG capsule Take 1 capsule by mouth 2 (Two) Times a Day. 60 capsule 11    fluticasone (FLONASE) 50 MCG/ACT nasal spray 2 sprays into the nostril(s) as directed by provider 2 (Two) Times a Day. 48 g 3    loratadine (CLARITIN) 10 MG tablet Take 1 tablet by mouth.      sucralfate (Carafate) 1 g tablet Take 1 tablet by mouth 4 (Four) Times a Day. 120 tablet 3       Allergies   Allergen Reactions    Mold Extract [Trichophyton] Other (See Comments)     fever       Social History     Socioeconomic History    Marital status: Single   Tobacco Use    Smoking status: Some Days     Types: Electronic Cigarette    Smokeless tobacco: Never   Vaping Use    Vaping Use: Former   Substance and Sexual Activity    Alcohol use: Yes     Comment: social    Drug use: Yes     Types: Marijuana    Sexual activity: Defer       Family  "History   Problem Relation Age of Onset    Diabetes Mother     Heart attack Maternal Grandfather     Hypertension Maternal Grandfather     Diabetes Maternal Grandfather     Lung cancer Other     Colon cancer Neg Hx     Colon polyps Neg Hx        Review of Systems   Constitutional:  Negative for chills, fever and unexpected weight change.   Respiratory:  Negative for shortness of breath.    Cardiovascular:  Negative for chest pain.   Gastrointestinal:  Negative for abdominal distention, abdominal pain, anal bleeding, blood in stool, constipation and diarrhea.        Vitals:    02/06/24 0941   BP: 128/96   Pulse: 83   Temp: 97.8 °F (36.6 °C)   SpO2: 100%   Weight: 96.6 kg (213 lb)   Height: 170.2 cm (67\")      Body mass index is 33.36 kg/m².    Physical Exam  Vitals reviewed.   Constitutional:       General: He is not in acute distress.  Cardiovascular:      Rate and Rhythm: Normal rate and regular rhythm.      Heart sounds: Normal heart sounds.   Pulmonary:      Effort: Pulmonary effort is normal.      Breath sounds: Normal breath sounds.   Abdominal:      General: Bowel sounds are normal. There is no distension.      Palpations: Abdomen is soft.      Tenderness: There is abdominal tenderness (mild tenderness frankie area.).   Skin:     General: Skin is warm and dry.   Neurological:      Mental Status: He is alert.         Results for orders placed or performed during the hospital encounter of 05/09/23   Tissue Pathology Exam    Specimen: Esophagus, Distal; Tissue   Result Value Ref Range    Note to Patients       This report may contain a detailed description of human tissue sent by a health care provider to the laboratory for pathologic evaluation. The content of this report is essential for diagnosis and may provide important critical findings. This information may be unfamiliar to patients to review without a medical professional present. It is advised that the patient review this report in the presence of a health " "care provider who can answer questions and explain the results.      Case Report       Surgical Pathology Report                         Case: [id number]                                  Authorizing Provider:  Bill Rasheed MD      Collected:           05/09/2023 01:21 PM          Ordering Location:     [facility]     Received:            05/09/2023 01:40 PM                                 ENDOSCOPY                                                                    Pathologist:           Lenard Simons MD                                                        Specimen:    Esophagus, Distal, bx GEJ                                                                  Final Diagnosis       Gastroesophageal junction, biopsies:  Esophageal squamous mucosa with ulceration.  No evidence of intestinal metaplasia, dysplasia, or malignancy.      Gross Description       1. Esophagus, Distal.   Received in a formalin filled container labeled with the patient's name, date of birth, and \"biopsy GEJ\".  The specimen consists of 3 yellow-gray soft tissue fragments aggregating to 0.1 x 0.1 by less than 0.1 cm, totally submitted in block 1A.        Microscopic Description       Microscopic examination performed.             ASSESSMENT AND PLAN    Assessment & Plan     Diagnoses and all orders for this visit:    1. History of esophagitis (Primary)  -     Case Request; Standing  -     Case Request    2. Abdominal bloating  -     Case Request; Standing  -     Case Request    3. Nausea and vomiting, unspecified vomiting type  -     Case Request; Standing  -     Case Request    4. Pharyngoesophageal dysphagia  -     Case Request; Standing  -     Case Request    Other orders  -     Implement Anesthesia Orders Day of Procedure; Standing  -     Obtain Informed Consent; Standing         He does have history history of LA grade D esophagitis. He was to have repeat egd last year after switching ppi to nexium. He did not have " repeat egd. I recommend we repeat egd now. Continue carafate and nexium daily. I recommend emergency room if worsening or severe symptoms. I recommend emergency room if worsening or severe symptoms.        We may need to repeat gallbladder test pending egd. Hida scan last year with low ef.          ESOPHAGOGASTRODUODENOSCOPY WITH ANESTHESIA (N/A)  Risk, benefits, and alternatives of endoscopy were explained in full.  They understand that there is a risk of bleeding, perforation, and infection.  The risk of perforation goes up with esophageal dilation.  Other options to evaluate UGI complaints could involve barium swallow or UGI series, but these would be diagnostic tests only.  Patient was given time to ask questions.  I answered them to their satisfaction and they are agreeable to proceeding         No follow-ups on file.          There are no Patient Instructions on file for this visit.      Ro Hill, APRN

## 2024-02-06 NOTE — PROGRESS NOTES
[facility] GASTROENTEROLOGY - OFFICE NOTE    2/6/2024    Ezekiel Palmer   2000    Primary Physician: Jazzy Infante MD    Chief Complaint   Patient presents with    GI Problem     Upper abdominal bloating, dysphagia , esophagitis.    Dysphagia       HISTORY OF PRESENT ILLNESS:     Ezekiel Palmer is a 23 y.o. male presents for f/u esophagitis. He is currently on nexium daily for the last year.   He was doing good until last 2 days has noted upper abdominal bloating.  Has had some n/v with belching that started 2 days ago, not associated with eating.  No hematemesis. No weight loss. No fever. No constipation.       Dysphagia   Started 1 mo ago. He points to the neck are where liquids will hang. No weight loss.         US Gallbladder (04/06/2023 08:34)   NM HIDA SCAN WITHOUT PHARMACOLOGICAL INTERVENTION (05/10/2023 15:12)         UPPER GI ENDOSCOPY (05/09/2023 13:12)           =========================================================================  Office visit  5-19-23 HPI  Ezekiel Palmer is a 22 y.o. male presents with ruq pain, n/v, and esophagitis.  He had recent ultrasound gallbladder, hida scan, and egd. See reports below. He is feeling good. No significant reflux symptoms. No dysphagia. No further n/v. No constipation.         He has not gotten nexium. He has taken omeprazole twice since 3/2023. Last n/v was 4-6-23.            NM HIDA SCAN WITHOUT PHARMACOLOGICAL INTERVENTION (05/10/2023 15:12)         UPPER GI ENDOSCOPY (05/09/2023 13:12)   - Persistent LA Grade D chronic esophagitis with no bleeding. Present despite omeprazole 40 mg twice daily. Biopsied.  - 4 cm hiatal hernia.  - Normal stomach.  - Normal examined duodenum.  Recommendations  - At this point, I feel he would probably benefit from surgical repair of hiatal hernia with Nissen fundoplication since he has not responded to reflux precautions and twice daily PPI therapy. We will discuss with  him this option at his upcoming office visit. If he elects to do the fundoplication, then I would suggest follow-up endoscopy 4 to 6 months after the the fundoplication to evaluate for possible Saldivar's. If he declines fundoplication then consider follow-up endoscopy in 2 months to assess his response to switching to Nexium.  Tissue Pathology Exam (05/09/2023 13:21)            US Gallbladder (04/06/2023 08:34)         ======================================================================  Office visit  3-29-23  [facility]  Ezekiel Palmer is a 22 y.o. male presents with ruq pain, nausea, and vomiting. He is referred by Tory DORANTES at [facility] in [place]. Ruq pain x 2 mo, intermittent. Typically occurs when having a bm.  Changed diet has helped. Resolves after belching and vomiting. He has nausea but not always with the vomiting. Has taken prilosec and carafate for a few years. He does miss doses.  He has tried pain med that helps at times. Takes motrin prn but not weekly. Takes advil prn but not weekly. He has history of ulcers. No fever. No black stool.              Having a bm daily. No rectal bleeding. Has noted some mucus the last 2 days. No weight loss.                  UPPER GI ENDOSCOPY (11/16/2020 10:41) by Dr. Rasheed,recall 2 mo.   - LA Grade D reflux esophagitis despite daily omeprazole 40 mg. Biopsied.  - 3 cm hiatal hernia.  - Normal stomach.  - Normal examined duodenum.  Path   Final Diagnosis   Distal esophagus, endoscopic biopsy:  A.  Chronic active esophagitis, severe, with extensive ulceration.  B.  No histologic evidence of eosinophilic esophagitis or Saldivar's esophagus.  C.  No dysplasia identified.     Past Medical History:   Diagnosis Date    Acute gastroenteritis     Allergic rhinitis     Diarrhea     Exposure to tobacco smoke     Gastroenteritis     GERD (gastroesophageal reflux disease)     Headache     Nausea and vomiting     with body aches    Rhinitis      Tachycardia     Upper respiratory infection     Well child check        Past Surgical History:   Procedure Laterality Date    DENTAL PROCEDURE  07/03/2001    frenuloplasty Dr. Peralta    ENDOSCOPY N/A 7/28/2017    Procedure: ESOPHAGOGASTRODUODENOSCOPY possible dilation ;  Surgeon: Rodney Huber MD;  Location: [facility] ENDOSCOPY;  Service:     ENDOSCOPY N/A 11/16/2020    Procedure: ESOPHAGOGASTRODUODENOSCOPY WITH ANESTHESIA;  Surgeon: Bill Rasheed MD;  Location:  [facility] ENDOSCOPY;  Service: Gastroenterology;  Laterality: N/A;  pre op: gerd  post op:hiatal hernia, esophagitis  PCP: Jazzy Infante MD    ENDOSCOPY N/A 5/9/2023    Procedure: ESOPHAGOGASTRODUODENOSCOPY WITH ANESTHESIA;  Surgeon: Bill Rasheed MD;  Location: [facility] ENDOSCOPY;  Service: Gastroenterology;  Laterality: N/A;  preop: nausa; right upper quadrant pain  post op: esophagitis; hiatal hernia   PCP:  Jazzy Infante MD     TYMPANOSTOMY  07/03/2001    Dr. Peralta       Outpatient Medications Marked as Taking for the 2/6/24 encounter (Office Visit) with Ro Hill APRN   Medication Sig Dispense Refill    esomeprazole (nexIUM) 40 MG capsule Take 1 capsule by mouth 2 (Two) Times a Day. 60 capsule 11    fluticasone (FLONASE) 50 MCG/ACT nasal spray 2 sprays into the nostril(s) as directed by provider 2 (Two) Times a Day. 48 g 3    loratadine (CLARITIN) 10 MG tablet Take 1 tablet by mouth.      sucralfate (Carafate) 1 g tablet Take 1 tablet by mouth 4 (Four) Times a Day. 120 tablet 3       Allergies   Allergen Reactions    Mold Extract [Trichophyton] Other (See Comments)     fever       Social History     Socioeconomic History    Marital status: Single   Tobacco Use    Smoking status: Some Days     Types: Electronic Cigarette    Smokeless tobacco: Never   Vaping Use    Vaping Use: Former   Substance and Sexual Activity    Alcohol use: Yes     Comment: social    Drug use: Yes     Types: Marijuana    Sexual activity: Defer       Family  "History   Problem Relation Age of Onset    Diabetes Mother     Heart attack Maternal Grandfather     Hypertension Maternal Grandfather     Diabetes Maternal Grandfather     Lung cancer Other     Colon cancer Neg Hx     Colon polyps Neg Hx        Review of Systems   Constitutional:  Negative for chills, fever and unexpected weight change.   Respiratory:  Negative for shortness of breath.    Cardiovascular:  Negative for chest pain.   Gastrointestinal:  Negative for abdominal distention, abdominal pain, anal bleeding, blood in stool, constipation and diarrhea.        Vitals:    02/06/24 0941   BP: 128/96   Pulse: 83   Temp: 97.8 °F (36.6 °C)   SpO2: 100%   Weight: 96.6 kg (213 lb)   Height: 170.2 cm (67\")      Body mass index is 33.36 kg/m².    Physical Exam  Vitals reviewed.   Constitutional:       General: He is not in acute distress.  Cardiovascular:      Rate and Rhythm: Normal rate and regular rhythm.      Heart sounds: Normal heart sounds.   Pulmonary:      Effort: Pulmonary effort is normal.      Breath sounds: Normal breath sounds.   Abdominal:      General: Bowel sounds are normal. There is no distension.      Palpations: Abdomen is soft.      Tenderness: There is abdominal tenderness (mild tenderness frankie area.).   Skin:     General: Skin is warm and dry.   Neurological:      Mental Status: He is alert.         Results for orders placed or performed during the hospital encounter of 05/09/23   Tissue Pathology Exam    Specimen: Esophagus, Distal; Tissue   Result Value Ref Range    Note to Patients       This report may contain a detailed description of human tissue sent by a health care provider to the laboratory for pathologic evaluation. The content of this report is essential for diagnosis and may provide important critical findings. This information may be unfamiliar to patients to review without a medical professional present. It is advised that the patient review this report in the presence of a health " "care provider who can answer questions and explain the results.      Case Report       Surgical Pathology Report                         Case: [id number]                                  Authorizing Provider:  Bill Rasheed MD      Collected:           05/09/2023 01:21 PM          Ordering Location:     [facility]     Received:            05/09/2023 01:40 PM                                 ENDOSCOPY                                                                    Pathologist:           Lenard Simons MD                                                        Specimen:    Esophagus, Distal, bx GEJ                                                                  Final Diagnosis       Gastroesophageal junction, biopsies:  Esophageal squamous mucosa with ulceration.  No evidence of intestinal metaplasia, dysplasia, or malignancy.      Gross Description       1. Esophagus, Distal.   Received in a formalin filled container labeled with the patient's name, date of birth, and \"biopsy GEJ\".  The specimen consists of 3 yellow-gray soft tissue fragments aggregating to 0.1 x 0.1 by less than 0.1 cm, totally submitted in block 1A.        Microscopic Description       Microscopic examination performed.             ASSESSMENT AND PLAN    Assessment & Plan     Diagnoses and all orders for this visit:    1. History of esophagitis (Primary)  -     Case Request; Standing  -     Case Request    2. Abdominal bloating  -     Case Request; Standing  -     Case Request    3. Nausea and vomiting, unspecified vomiting type  -     Case Request; Standing  -     Case Request    4. Pharyngoesophageal dysphagia  -     Case Request; Standing  -     Case Request    Other orders  -     Implement Anesthesia Orders Day of Procedure; Standing  -     Obtain Informed Consent; Standing         He does have history history of LA grade D esophagitis. He was to have repeat egd last year after switching ppi to nexium. He did not have " repeat egd. I recommend we repeat egd now. Continue carafate and nexium daily. I recommend emergency room if worsening or severe symptoms. I recommend emergency room if worsening or severe symptoms.        We may need to repeat gallbladder test pending egd. Hida scan last year with low ef.          ESOPHAGOGASTRODUODENOSCOPY WITH ANESTHESIA (N/A)  Risk, benefits, and alternatives of endoscopy were explained in full.  They understand that there is a risk of bleeding, perforation, and infection.  The risk of perforation goes up with esophageal dilation.  Other options to evaluate UGI complaints could involve barium swallow or UGI series, but these would be diagnostic tests only.  Patient was given time to ask questions.  I answered them to their satisfaction and they are agreeable to proceeding         No follow-ups on file.          There are no Patient Instructions on file for this visit.      Ro Hill, APRN

## 2024-02-20 ENCOUNTER — ANESTHESIA EVENT (OUTPATIENT)
Dept: GASTROENTEROLOGY | Facility: HOSPITAL | Age: 24
End: 2024-02-20
Payer: COMMERCIAL

## 2024-02-20 ENCOUNTER — TELEPHONE (OUTPATIENT)
Dept: GASTROENTEROLOGY | Facility: CLINIC | Age: 24
End: 2024-02-20
Payer: COMMERCIAL

## 2024-02-20 ENCOUNTER — HOSPITAL ENCOUNTER (OUTPATIENT)
Facility: HOSPITAL | Age: 24
Setting detail: HOSPITAL OUTPATIENT SURGERY
Discharge: HOME | End: 2024-02-20
Attending: INTERNAL MEDICINE | Admitting: INTERNAL MEDICINE
Payer: COMMERCIAL

## 2024-02-20 ENCOUNTER — ANESTHESIA (OUTPATIENT)
Dept: GASTROENTEROLOGY | Facility: HOSPITAL | Age: 24
End: 2024-02-20
Payer: COMMERCIAL

## 2024-02-20 VITALS
OXYGEN SATURATION: 99 % | HEIGHT: 67 IN | DIASTOLIC BLOOD PRESSURE: 84 MMHG | TEMPERATURE: 98.3 F | BODY MASS INDEX: 32.96 KG/M2 | HEART RATE: 71 BPM | SYSTOLIC BLOOD PRESSURE: 119 MMHG | RESPIRATION RATE: 20 BRPM | WEIGHT: 210 LBS

## 2024-02-20 DIAGNOSIS — R11.2 NAUSEA AND VOMITING, UNSPECIFIED VOMITING TYPE: ICD-10-CM

## 2024-02-20 DIAGNOSIS — K21.00 GASTROESOPHAGEAL REFLUX DISEASE WITH ESOPHAGITIS WITHOUT HEMORRHAGE: Primary | ICD-10-CM

## 2024-02-20 DIAGNOSIS — R13.14 PHARYNGOESOPHAGEAL DYSPHAGIA: ICD-10-CM

## 2024-02-20 DIAGNOSIS — Z87.19 HISTORY OF ESOPHAGITIS: ICD-10-CM

## 2024-02-20 DIAGNOSIS — R14.0 ABDOMINAL BLOATING: ICD-10-CM

## 2024-02-20 PROCEDURE — 88305 TISSUE EXAM BY PATHOLOGIST: CPT | Performed by: INTERNAL MEDICINE

## 2024-02-20 PROCEDURE — 25810000003 SODIUM CHLORIDE 0.9 % SOLUTION: Performed by: ANESTHESIOLOGY

## 2024-02-20 PROCEDURE — 88313 SPECIAL STAINS GROUP 2: CPT | Performed by: INTERNAL MEDICINE

## 2024-02-20 PROCEDURE — 43239 EGD BIOPSY SINGLE/MULTIPLE: CPT | Performed by: INTERNAL MEDICINE

## 2024-02-20 PROCEDURE — 25010000002 PROPOFOL 10 MG/ML EMULSION: Performed by: NURSE ANESTHETIST, CERTIFIED REGISTERED

## 2024-02-20 RX ORDER — PROPOFOL 10 MG/ML
VIAL (ML) INTRAVENOUS AS NEEDED
Status: DISCONTINUED | OUTPATIENT
Start: 2024-02-20 | End: 2024-02-20 | Stop reason: SURG

## 2024-02-20 RX ORDER — ONDANSETRON 2 MG/ML
4 INJECTION INTRAMUSCULAR; INTRAVENOUS ONCE AS NEEDED
Status: DISCONTINUED | OUTPATIENT
Start: 2024-02-20 | End: 2024-02-20 | Stop reason: HOSPADM

## 2024-02-20 RX ORDER — SODIUM CHLORIDE 0.9 % (FLUSH) 0.9 %
10 SYRINGE (ML) INJECTION AS NEEDED
Status: DISCONTINUED | OUTPATIENT
Start: 2024-02-20 | End: 2024-02-20 | Stop reason: HOSPADM

## 2024-02-20 RX ORDER — SODIUM CHLORIDE 9 MG/ML
500 INJECTION, SOLUTION INTRAVENOUS CONTINUOUS PRN
Status: DISCONTINUED | OUTPATIENT
Start: 2024-02-20 | End: 2024-02-20 | Stop reason: HOSPADM

## 2024-02-20 RX ORDER — LIDOCAINE HYDROCHLORIDE 20 MG/ML
INJECTION, SOLUTION EPIDURAL; INFILTRATION; INTRACAUDAL; PERINEURAL AS NEEDED
Status: DISCONTINUED | OUTPATIENT
Start: 2024-02-20 | End: 2024-02-20 | Stop reason: SURG

## 2024-02-20 RX ORDER — LIDOCAINE HYDROCHLORIDE 10 MG/ML
0.5 INJECTION, SOLUTION EPIDURAL; INFILTRATION; INTRACAUDAL; PERINEURAL ONCE AS NEEDED
Status: DISCONTINUED | OUTPATIENT
Start: 2024-02-20 | End: 2024-02-20 | Stop reason: HOSPADM

## 2024-02-20 RX ADMIN — SODIUM CHLORIDE 500 ML: 9 INJECTION, SOLUTION INTRAVENOUS at 13:20

## 2024-02-20 RX ADMIN — PROPOFOL INJECTABLE EMULSION 30 MG: 10 INJECTION, EMULSION INTRAVENOUS at 14:04

## 2024-02-20 RX ADMIN — PROPOFOL INJECTABLE EMULSION 30 MG: 10 INJECTION, EMULSION INTRAVENOUS at 14:06

## 2024-02-20 RX ADMIN — PROPOFOL INJECTABLE EMULSION 50 MG: 10 INJECTION, EMULSION INTRAVENOUS at 14:02

## 2024-02-20 RX ADMIN — PROPOFOL INJECTABLE EMULSION 70 MG: 10 INJECTION, EMULSION INTRAVENOUS at 14:01

## 2024-02-20 RX ADMIN — PROPOFOL INJECTABLE EMULSION 50 MG: 10 INJECTION, EMULSION INTRAVENOUS at 14:03

## 2024-02-20 RX ADMIN — PROPOFOL INJECTABLE EMULSION 30 MG: 10 INJECTION, EMULSION INTRAVENOUS at 14:05

## 2024-02-20 RX ADMIN — LIDOCAINE HYDROCHLORIDE 60 MG: 20 INJECTION, SOLUTION EPIDURAL; INFILTRATION; INTRACAUDAL; PERINEURAL at 14:01

## 2024-02-20 NOTE — ANESTHESIA POSTPROCEDURE EVALUATION
Patient: Ezekiel Palmer    Procedure Summary       Date: 02/20/24 Room / Location:  [facility] 2 /  [facility]    Anesthesia Start: 1357 Anesthesia Stop: 1410    Procedure: ESOPHAGOGASTRODUODENOSCOPY WITH ANESTHESIA Diagnosis:       Abdominal bloating      History of esophagitis      Nausea and vomiting, unspecified vomiting type      Pharyngoesophageal dysphagia      (Abdominal bloating [R14.0])      (History of esophagitis [Z87.19])      (Nausea and vomiting, unspecified vomiting type [R11.2])      (Pharyngoesophageal dysphagia [R13.14])    Surgeons: Bill Rasheed MD Provider: Jacinto Norwood CRNA    Anesthesia Type: MAC ASA Status: 2            Anesthesia Type: MAC    Vitals  Vitals Value Taken Time   /84 02/20/24 1426   Temp     Pulse 69 02/20/24 1429   Resp 20 02/20/24 1425   SpO2 99 % 02/20/24 1429   Vitals shown include unfiled device data.        Post Anesthesia Care and Evaluation    Patient location during evaluation: PHASE II  Patient participation: complete - patient participated  Level of consciousness: awake  Pain score: 0  Pain management: adequate    Airway patency: patent  Anesthetic complications: No anesthetic complications  PONV Status: none  Cardiovascular status: acceptable  Respiratory status: acceptable  Hydration status: acceptable

## 2024-02-20 NOTE — ANESTHESIA PREPROCEDURE EVALUATION
Anesthesia Evaluation     Patient summary reviewed   no history of anesthetic complications:   NPO Solid Status: > 8 hours             Airway   Mallampati: I  Dental      Pulmonary - negative pulmonary ROS   (+) a smoker,  Cardiovascular - negative cardio ROS  Exercise tolerance: excellent (>7 METS)        Neuro/Psych- negative ROS  GI/Hepatic/Renal/Endo - negative ROS   (+) GERD    Musculoskeletal     Abdominal    Substance History      OB/GYN          Other                    Anesthesia Plan    ASA 2     MAC       Anesthetic plan, risks, benefits, and alternatives have been provided, discussed and informed consent has been obtained with: patient.    CODE STATUS:

## 2024-02-20 NOTE — TELEPHONE ENCOUNTER
Today, I put in a referral to Dr. Ankit Norwood for this patient.  I am referring to Dr. Norwood for evaluation of possible Nissen fundoplication.  Can you please check to make sure that referral went through and the patient got his appointment.

## 2024-02-22 LAB
LAB AP CASE REPORT: NORMAL
Lab: NORMAL
PATH REPORT.FINAL DX SPEC: NORMAL
PATH REPORT.GROSS SPEC: NORMAL

## 2024-02-27 ENCOUNTER — OFFICE VISIT (OUTPATIENT)
Dept: BARIATRICS/WEIGHT MGMT | Facility: CLINIC | Age: 24
End: 2024-02-27
Payer: COMMERCIAL

## 2024-02-27 VITALS
HEART RATE: 70 BPM | DIASTOLIC BLOOD PRESSURE: 83 MMHG | WEIGHT: 208.2 LBS | OXYGEN SATURATION: 98 % | HEIGHT: 67 IN | BODY MASS INDEX: 32.68 KG/M2 | TEMPERATURE: 97.4 F | SYSTOLIC BLOOD PRESSURE: 122 MMHG

## 2024-02-27 DIAGNOSIS — K21.00 GASTROESOPHAGEAL REFLUX DISEASE WITH ESOPHAGITIS WITHOUT HEMORRHAGE: Primary | ICD-10-CM

## 2024-02-27 PROCEDURE — 99204 OFFICE O/P NEW MOD 45 MIN: CPT | Performed by: SURGERY

## 2024-02-27 PROCEDURE — 1160F RVW MEDS BY RX/DR IN RCRD: CPT | Performed by: SURGERY

## 2024-02-27 PROCEDURE — 1159F MED LIST DOCD IN RCRD: CPT | Performed by: SURGERY

## 2024-02-27 NOTE — PROGRESS NOTES
General Surgery   History and Physical     Referring Provider: Bill Rasheed MD    Patient Care Team:  Jazzy Infante MD as PCP - General (Family Medicine)  Bill Rasheed MD as Consulting Physician (Gastroenterology)    Chief complaint: GERD    Subjective      History of present illness:  The patient is a 23 y.o. male presents with GERD symptoms since the age of 15.  He attributes it to lifestyle and behaviors in the past.  He has since changed those behaviors and has been more focused on his health per his statement.  He stated however he had a recent episode of GERD and bloating.  He was seen by his gastroenterologist who performed a scope and noticed a hiatal hernia as well as esophagitis.  He was referred to our services to address these issues for possible repair.      Review of Systems    Review of Systems - General ROS: negative  ENT ROS: negative  Respiratory ROS: no cough, shortness of breath, or wheezing  Cardiovascular ROS: no chest pain or dyspnea on exertion  Gastrointestinal ROS: positive for - gas/bloating and heartburn  negative for - blood in stools, constipation, diarrhea, hematemesis, nausea/vomiting, or swallowing difficulty/pain    History  Past Medical History:   Diagnosis Date    Acute gastroenteritis     Allergic rhinitis     Diarrhea     Exposure to tobacco smoke     Gastroenteritis     GERD (gastroesophageal reflux disease)     Headache     Nausea and vomiting     with body aches    Rhinitis     Tachycardia     Upper respiratory infection     Well child check    ,   Past Surgical History:   Procedure Laterality Date    DENTAL PROCEDURE  07/03/2001    frenuloplasty Dr. Peralta    ENDOSCOPY N/A 7/28/2017    Procedure: ESOPHAGOGASTRODUODENOSCOPY possible dilation ;  Surgeon: Rodney Huber MD;  Location: [facility] ENDOSCOPY;  Service:     ENDOSCOPY N/A 11/16/2020    Procedure: ESOPHAGOGASTRODUODENOSCOPY WITH ANESTHESIA;  Surgeon: Bill Rasheed MD;  Location: [facility] ENDOSCOPY;   Service: Gastroenterology;  Laterality: N/A;  pre op: gerd  post op:hiatal hernia, esophagitis  PCP: Jazzy Infante MD    ENDOSCOPY N/A 5/9/2023    Procedure: ESOPHAGOGASTRODUODENOSCOPY WITH ANESTHESIA;  Surgeon: Bill Rasheed MD;  Location:  [facility] ENDOSCOPY;  Service: Gastroenterology;  Laterality: N/A;  preop: nausa; right upper quadrant pain  post op: esophagitis; hiatal hernia   PCP:  Jazzy Infante MD     ENDOSCOPY N/A 2/20/2024    Procedure: ESOPHAGOGASTRODUODENOSCOPY WITH ANESTHESIA;  Surgeon: Bill Rasheed MD;  Location:  [facility] ENDOSCOPY;  Service: Gastroenterology;  Laterality: N/A;  pre esophagitis  post esophagitis  Dr. Infante    TYMPANOSTOMY  07/03/2001    Dr. Peralta   ,   Family History   Problem Relation Age of Onset    Diabetes Mother     Heart attack Maternal Grandfather     Hypertension Maternal Grandfather     Diabetes Maternal Grandfather     Lung cancer Other     Colon cancer Neg Hx     Colon polyps Neg Hx    ,   Social History     Tobacco Use    Smoking status: Some Days     Types: Electronic Cigarette    Smokeless tobacco: Never   Vaping Use    Vaping Use: Every day    Substances: Nicotine, Flavoring    Devices: Disposable, Pre-filled or refillable cartridge, Pre-filled pod    Passive vaping exposure: Yes   Substance Use Topics    Alcohol use: Yes     Comment: social    Drug use: Yes     Types: Marijuana   , (Not in a hospital admission)   and Allergies:  Mold extract [trichophyton]    Current Outpatient Medications:     esomeprazole (nexIUM) 40 MG capsule, Take 1 capsule by mouth 2 (Two) Times a Day., Disp: 60 capsule, Rfl: 11    fluticasone (FLONASE) 50 MCG/ACT nasal spray, 2 sprays into the nostril(s) as directed by provider 2 (Two) Times a Day., Disp: 48 g, Rfl: 3    loratadine (CLARITIN) 10 MG tablet, Take 1 tablet by mouth., Disp: , Rfl:     sucralfate (Carafate) 1 g tablet, Take 1 tablet by mouth 4 (Four) Times a Day., Disp: 120 tablet, Rfl: 3    Objective     Vital  Signs   Temp:  [97.4 °F (36.3 °C)] 97.4 °F (36.3 °C)  Heart Rate:  [70] 70  BP: (122)/(83) 122/83    Physical Exam:  Physical Exam  Constitutional:       Appearance: Normal appearance. He is obese.   Cardiovascular:      Rate and Rhythm: Normal rate.      Pulses: Normal pulses.      Heart sounds: Normal heart sounds.   Pulmonary:      Effort: Pulmonary effort is normal.      Breath sounds: Normal breath sounds.   Abdominal:      General: Bowel sounds are normal. There is no distension.      Palpations: Abdomen is soft. There is no mass.      Tenderness: There is no abdominal tenderness.      Hernia: No hernia is present.   Neurological:      Mental Status: He is alert.        Results Review:     Lab Results (last 24 hours)       ** No results found for the last 24 hours. **          Imaging Results (Last 24 Hours)       ** No results found for the last 24 hours. **            ASSESSMENT/PLAN   Diagnosis Plan   1. Gastroesophageal reflux disease with esophagitis without hemorrhage           Patient and I discussed in length behavior modifications as well as dietary changes to help facilitate decreasing his symptoms from his GERD.  I have also recommended that he obtain a manometry study which will be placed accordingly.  He is to follow-up with us in 1 month's time with his food journal associated with symptoms of foods that exacerbate or alleviate his symptoms.  Upon that visit we will also discuss his manometry findings as well as surgical options for reversing his hiatal hernia.    Ankit Norwood MD  02/27/24  08:50 CST

## 2024-05-31 ENCOUNTER — PATIENT ROUNDING (BHMG ONLY) (OUTPATIENT)
Dept: URGENT CARE | Facility: CLINIC | Age: 24
End: 2024-05-31
Payer: COMMERCIAL

## 2024-05-31 NOTE — ED NOTES
Thank you for letting us care for you in your recent visit to our urgent care center. We would love to hear about your experience with us. Was this the first time you have visited our location?    We’re always looking for ways to make our patients’ experiences even better. Do you have any recommendations on ways we may improve?     I appreciate you taking the time to respond. Please be on the lookout for a survey about your recent visit from [employer] via text or email. We would greatly appreciate if you could fill that out and turn it back in. We want your voice to be heard and we value your feedback.   Thank you for choosing [facility] for your healthcare needs.

## 2024-06-08 ENCOUNTER — HOSPITAL ENCOUNTER (OUTPATIENT)
Dept: GENERAL RADIOLOGY | Age: 24
End: 2024-06-08
Payer: COMMERCIAL

## 2024-06-08 ENCOUNTER — OFFICE VISIT (OUTPATIENT)
Age: 24
End: 2024-06-08

## 2024-06-08 VITALS
HEIGHT: 67 IN | DIASTOLIC BLOOD PRESSURE: 80 MMHG | BODY MASS INDEX: 34.53 KG/M2 | SYSTOLIC BLOOD PRESSURE: 132 MMHG | RESPIRATION RATE: 20 BRPM | HEART RATE: 80 BPM | WEIGHT: 220 LBS | TEMPERATURE: 97.6 F | OXYGEN SATURATION: 98 %

## 2024-06-08 DIAGNOSIS — M79.674 GREAT TOE PAIN, RIGHT: ICD-10-CM

## 2024-06-08 DIAGNOSIS — S99.921A INJURY OF RIGHT GREAT TOE, INITIAL ENCOUNTER: ICD-10-CM

## 2024-06-08 DIAGNOSIS — M79.674 GREAT TOE PAIN, RIGHT: Primary | ICD-10-CM

## 2024-06-08 PROCEDURE — 96372 THER/PROPH/DIAG INJ SC/IM: CPT

## 2024-06-08 PROCEDURE — 99203 OFFICE O/P NEW LOW 30 MIN: CPT

## 2024-06-08 PROCEDURE — 73630 X-RAY EXAM OF FOOT: CPT

## 2024-06-08 RX ORDER — FLUTICASONE PROPIONATE 50 MCG
2 SPRAY, SUSPENSION (ML) NASAL 2 TIMES DAILY
COMMUNITY
Start: 2023-07-12

## 2024-06-08 RX ORDER — KETOROLAC TROMETHAMINE 30 MG/ML
60 INJECTION, SOLUTION INTRAMUSCULAR; INTRAVENOUS ONCE
Status: COMPLETED | OUTPATIENT
Start: 2024-06-08 | End: 2024-06-08

## 2024-06-08 RX ORDER — LORATADINE 10 MG/1
10 TABLET ORAL
COMMUNITY

## 2024-06-08 RX ORDER — ESOMEPRAZOLE MAGNESIUM 40 MG/1
40 CAPSULE, DELAYED RELEASE ORAL 2 TIMES DAILY
COMMUNITY
Start: 2023-05-09

## 2024-06-08 RX ORDER — DEXAMETHASONE SODIUM PHOSPHATE 10 MG/ML
10 INJECTION INTRAMUSCULAR; INTRAVENOUS ONCE
Status: COMPLETED | OUTPATIENT
Start: 2024-06-08 | End: 2024-06-08

## 2024-06-08 RX ADMIN — DEXAMETHASONE SODIUM PHOSPHATE 10 MG: 10 INJECTION INTRAMUSCULAR; INTRAVENOUS at 17:39

## 2024-06-08 RX ADMIN — KETOROLAC TROMETHAMINE 60 MG: 30 INJECTION, SOLUTION INTRAMUSCULAR; INTRAVENOUS at 17:41

## 2024-06-08 ASSESSMENT — ENCOUNTER SYMPTOMS
CONSTIPATION: 0
EYE PAIN: 0
SHORTNESS OF BREATH: 0
COLOR CHANGE: 0
ABDOMINAL DISTENTION: 0
FACIAL SWELLING: 0
SINUS PAIN: 0
TROUBLE SWALLOWING: 0
NAUSEA: 0
ABDOMINAL PAIN: 0
COUGH: 0
CHEST TIGHTNESS: 0
DIARRHEA: 0
SORE THROAT: 0
EYE DISCHARGE: 0
EYE REDNESS: 0
CHOKING: 0
STRIDOR: 0
SINUS PRESSURE: 0
WHEEZING: 0
VOMITING: 0
APNEA: 0

## 2024-06-08 NOTE — PROGRESS NOTES
Medication was administered by Brenda Schwab MA at 5:41 PM.    Medication: dexamethasone  Amount: 10mg  Route: intramuscular  Site: Dorsogluteal right    Patient tolerated well.  
Medication was administered by Brenda Schwab MA at 5:42 PM.    Medication: ketorolac tromethamine(Toradol) 60mg  Amount: 60mg  Route: intramuscular  Site: Dorsogluteal left    Patient tolerated well.  
 Pulse 80   Temp 97.6 °F (36.4 °C) (Temporal)   Resp 20   Ht 1.702 m (5' 7\")   Wt 99.8 kg (220 lb)   SpO2 98%   BMI 34.46 kg/m²     Assessment   Assessment & Plan      Diagnosis Orders   1. Great toe pain, right  XR FOOT RIGHT (MIN 3 VIEWS)    ketorolac (TORADOL) injection 60 mg    dexAMETHasone (DECADRON) injection 10 mg      2. Injury of right great toe, initial encounter  XR FOOT RIGHT (MIN 3 VIEWS)    ketorolac (TORADOL) injection 60 mg    dexAMETHasone (DECADRON) injection 10 mg          Plan   Right toe pain/injury  Xray of right foot ordered; will call with results.    Dexamethasone 10 mg injection given in office today.    Toradol 60 mg injection given in office today.  Do not take any more NSAIDs today for the next 24 hours.  May take Tylenol.    Worker's Comp. paperwork completed.    Rotate ice/heat as needed - use only 15 minutes at a time    Encouraged adequate rest    Recommended compression/bracing    Recommended elevation of the area    Patient may use ibuprofen or tylenol for pain    The patient is to follow up with PCP or return to clinic if symptoms worsen/fail to improve.    Any condition can change, despite proper treatment. Therefore, if symptoms still persist or worsen after treatment plan intitated today, either go to the nearest ER, or call PCP, or return to UC for further evaluation.    Urgent Care evaluation today is not a substitute for PCP visit. Follow up care is your responsibility to discuss and review this UC visit.    Discussed use, benefits, and side effects of any prescribed medications. All patient questions were answered. Patient demonstrates understanding and agrees with care plan. Patient was given educational materials - see patient instructions below         Elevated Blood Pressure Reading  Blood pressure was elevated in office today. Discussed this with patient in office.    1st BP reading was 142/82    2nd BP reading was 132/80    Monitor salt intake    Encouraged

## 2024-06-18 ENCOUNTER — OFFICE VISIT (OUTPATIENT)
Dept: FAMILY MEDICINE CLINIC | Facility: CLINIC | Age: 24
End: 2024-06-18
Payer: COMMERCIAL

## 2024-06-18 VITALS
TEMPERATURE: 97.8 F | HEART RATE: 82 BPM | RESPIRATION RATE: 20 BRPM | OXYGEN SATURATION: 97 % | BODY MASS INDEX: 34.31 KG/M2 | DIASTOLIC BLOOD PRESSURE: 76 MMHG | WEIGHT: 218.6 LBS | SYSTOLIC BLOOD PRESSURE: 114 MMHG | HEIGHT: 67 IN

## 2024-06-18 DIAGNOSIS — Z11.59 NEED FOR HEPATITIS C SCREENING TEST: ICD-10-CM

## 2024-06-18 DIAGNOSIS — Z00.00 WELL ADULT EXAM: Primary | ICD-10-CM

## 2024-06-18 DIAGNOSIS — K21.00 GASTROESOPHAGEAL REFLUX DISEASE WITH ESOPHAGITIS WITHOUT HEMORRHAGE: ICD-10-CM

## 2024-06-18 DIAGNOSIS — Z13.1 DIABETES MELLITUS SCREENING: ICD-10-CM

## 2024-06-18 DIAGNOSIS — Z13.0 SCREENING FOR DEFICIENCY ANEMIA: ICD-10-CM

## 2024-06-18 DIAGNOSIS — E66.09 CLASS 1 OBESITY DUE TO EXCESS CALORIES WITHOUT SERIOUS COMORBIDITY WITH BODY MASS INDEX (BMI) OF 34.0 TO 34.9 IN ADULT: ICD-10-CM

## 2024-06-18 DIAGNOSIS — Z13.220 LIPID SCREENING: ICD-10-CM

## 2024-06-18 PROCEDURE — 99395 PREV VISIT EST AGE 18-39: CPT | Performed by: FAMILY MEDICINE

## 2024-06-18 PROCEDURE — 1126F AMNT PAIN NOTED NONE PRSNT: CPT | Performed by: FAMILY MEDICINE

## 2024-06-18 PROCEDURE — 1159F MED LIST DOCD IN RCRD: CPT | Performed by: FAMILY MEDICINE

## 2024-06-18 PROCEDURE — 1160F RVW MEDS BY RX/DR IN RCRD: CPT | Performed by: FAMILY MEDICINE

## 2024-06-18 PROCEDURE — 2014F MENTAL STATUS ASSESS: CPT | Performed by: FAMILY MEDICINE

## 2024-06-18 NOTE — PROGRESS NOTES
"Subjective   Ezekiel Palmer is a 23 y.o. male.     History of Present Illness  The patient presents for an adult exam.    The patient underwent a pH test and manometry test a few months ago at [facility], however, the results of which are currently pending. He continues to experience significant acid reflux and is currently on Nexium, administered twice daily. His weight has recently fluctuated, with his previous weight being 210 pounds, then increased to 220 pounds, and currently at 218 pounds. His occupation involves significant lifting, which he has been attempting to incorporate more into his routine.   He had his tetanus vaccine in 2010.    Results  Imaging  pH study was abnormal, indicating significant acid in abdomen. Manometer test showed abnormal demonstrating low resting pressure of the lower esophageal sphincter with normal peristalsis. Hiatal hernia was confirmed.    The following portions of the patient's history were reviewed and updated as appropriate: allergies, current medications, past family history, past medical history, past social history, past surgical history, and problem list.        A review of systems was performed, and pertinent findings are noted in the HPI.    Objective   /76 (BP Location: Right arm, Patient Position: Sitting, Cuff Size: Large Adult)   Pulse 82   Temp 97.8 °F (36.6 °C) (Infrared)   Resp 20   Ht 170.2 cm (67\")   Wt 99.2 kg (218 lb 9.6 oz)   SpO2 97%   BMI 34.24 kg/m²    BMI is >= 30 and <35. (Class 1 Obesity). The following options were offered after discussion;: exercise counseling/recommendations and nutrition counseling/recommendations     Physical Exam  Vitals and nursing note reviewed.   Constitutional:       General: He is not in acute distress.     Appearance: He is well-developed. He is obese. He is not diaphoretic.   HENT:      Head: Normocephalic and atraumatic.      Right Ear: External ear normal.      Left Ear: External ear " normal.      Nose: Nose normal.   Eyes:      General:         Right eye: No discharge.         Left eye: No discharge.      Conjunctiva/sclera: Conjunctivae normal.   Neck:      Thyroid: No thyromegaly.      Trachea: No tracheal deviation.   Cardiovascular:      Rate and Rhythm: Normal rate and regular rhythm.      Heart sounds: Normal heart sounds.   Pulmonary:      Effort: Pulmonary effort is normal. No respiratory distress.      Breath sounds: Normal breath sounds. No stridor. No wheezing.   Chest:      Chest wall: No tenderness.   Musculoskeletal:         General: Normal range of motion.      Cervical back: Normal range of motion.   Lymphadenopathy:      Cervical: No cervical adenopathy.   Skin:     General: Skin is warm and dry.   Neurological:      Mental Status: He is alert and oriented to person, place, and time.      Motor: No abnormal muscle tone.      Coordination: Coordination normal.   Psychiatric:         Behavior: Behavior normal.         Thought Content: Thought content normal.         Judgment: Judgment normal.         Assessment & Plan   Problems Addressed this Visit          Gastrointestinal Abdominal     GERD with esophagitis     Other Visit Diagnoses       Well adult exam    -  Primary    Class 1 obesity due to excess calories without serious comorbidity with body mass index (BMI) of 34.0 to 34.9 in adult        Lipid screening        Relevant Orders    Lipid panel    Screening for deficiency anemia        Relevant Orders    CBC No Differential    Diabetes mellitus screening        Relevant Orders    Comprehensive metabolic panel    Need for hepatitis C screening test        Relevant Orders    Hepatitis C antibody          Diagnoses         Codes Comments    Well adult exam    -  Primary ICD-10-CM: Z00.00  ICD-9-CM: V70.0     Gastroesophageal reflux disease with esophagitis without hemorrhage     ICD-10-CM: K21.00  ICD-9-CM: 530.81, 530.10     Class 1 obesity due to excess calories without  serious comorbidity with body mass index (BMI) of 34.0 to 34.9 in adult     ICD-10-CM: E66.09, Z68.34  ICD-9-CM: 278.00, V85.34     Lipid screening     ICD-10-CM: Z13.220  ICD-9-CM: V77.91     Screening for deficiency anemia     ICD-10-CM: Z13.0  ICD-9-CM: V78.1     Diabetes mellitus screening     ICD-10-CM: Z13.1  ICD-9-CM: V77.1     Need for hepatitis C screening test     ICD-10-CM: Z11.59  ICD-9-CM: V73.89             Assessment & Plan  1. Adult exam.  The patient's immunizations have been reviewed and discussed. He has been advised to return for fasting labs. Lifestyle modifications have been advised. His screenings are current.               Transcribed from ambient dictation for Jazzy Infante MD by Jazzy Infante MD.  06/18/24   07:58 CDT    Patient or patient representative verbalized consent for the use of Ambient Listening during the visit with  Jazzy Infante MD for chart documentation. 6/18/2024  07:58 CDT          This document has been electronically signed by Jazzy Infante MD on June 18, 2024 08:42 CDT

## 2024-07-22 ENCOUNTER — OFFICE VISIT (OUTPATIENT)
Dept: BARIATRICS/WEIGHT MGMT | Facility: CLINIC | Age: 24
End: 2024-07-22
Payer: COMMERCIAL

## 2024-07-22 VITALS
TEMPERATURE: 99.3 F | HEIGHT: 67 IN | DIASTOLIC BLOOD PRESSURE: 81 MMHG | HEART RATE: 74 BPM | SYSTOLIC BLOOD PRESSURE: 119 MMHG | WEIGHT: 220 LBS | BODY MASS INDEX: 34.53 KG/M2 | OXYGEN SATURATION: 98 %

## 2024-07-22 DIAGNOSIS — K21.00 HIATAL HERNIA WITH GASTROESOPHAGEAL REFLUX DISEASE AND ESOPHAGITIS: ICD-10-CM

## 2024-07-22 DIAGNOSIS — K44.9 HIATAL HERNIA WITH GASTROESOPHAGEAL REFLUX DISEASE AND ESOPHAGITIS: ICD-10-CM

## 2024-07-22 DIAGNOSIS — Z72.0 VAPES NICOTINE CONTAINING SUBSTANCE: Primary | ICD-10-CM

## 2024-07-22 PROCEDURE — 99214 OFFICE O/P EST MOD 30 MIN: CPT | Performed by: SURGERY

## 2024-07-22 NOTE — PROGRESS NOTES
General Surgery   History and Physical     Referring Provider: No ref. provider found    Patient Care Team:  Jazzy Infante MD as PCP - General (Family Medicine)  Bill Rasheed MD as Consulting Physician (Gastroenterology)    Chief complaint: GERD    Subjective      History of present illness:  The patient is a 23 y.o. male presents with GERD and findings of a hiatal hernia.   Ezekiel Hall Palmer several years ago.  He stated this has been chronic and worsens with specific types of foods.  He also has been taking PPIs for some relief.  He is here for discussion with respect to having his hiatal hernia repair and treatment of his symptoms of GERD.    Review of Systems    Review of Systems - General ROS: negative  ENT ROS: negative  Respiratory ROS: no cough, shortness of breath, or wheezing  Cardiovascular ROS: no chest pain or dyspnea on exertion  Gastrointestinal ROS: no abdominal pain, change in bowel habits, or black or bloody stools  positive for - heartburn  Genito-Urinary ROS: no dysuria, trouble voiding, or hematuria    History  Past Medical History:   Diagnosis Date    Acute gastroenteritis     Allergic rhinitis     Diarrhea     Exposure to tobacco smoke     Gastroenteritis     GERD (gastroesophageal reflux disease)     Headache     Nausea and vomiting     with body aches    Rhinitis     Tachycardia     Upper respiratory infection     Well child check    ,   Past Surgical History:   Procedure Laterality Date    DENTAL PROCEDURE  07/03/2001    frenuloplasty Dr. Peralta    ENDOSCOPY N/A 7/28/2017    Procedure: ESOPHAGOGASTRODUODENOSCOPY possible dilation ;  Surgeon: Rodney Huber MD;  Location: [facility] ENDOSCOPY;  Service:     ENDOSCOPY N/A 11/16/2020    Procedure: ESOPHAGOGASTRODUODENOSCOPY WITH ANESTHESIA;  Surgeon: Bill Rasheed MD;  Location: [facility] ENDOSCOPY;  Service: Gastroenterology;  Laterality: N/A;  pre op: gerd  post op:hiatal hernia, esophagitis  PCP: Jazzy Infante MD     ENDOSCOPY N/A 5/9/2023    Procedure: ESOPHAGOGASTRODUODENOSCOPY WITH ANESTHESIA;  Surgeon: Bill Rasheed MD;  Location:  [facility] ENDOSCOPY;  Service: Gastroenterology;  Laterality: N/A;  preop: nausa; right upper quadrant pain  post op: esophagitis; hiatal hernia   PCP:  Jazzy Infante MD     ENDOSCOPY N/A 2/20/2024    Procedure: ESOPHAGOGASTRODUODENOSCOPY WITH ANESTHESIA;  Surgeon: Bill Rasheed MD;  Location:  [facility] ENDOSCOPY;  Service: Gastroenterology;  Laterality: N/A;  pre esophagitis  post esophagitis  Dr. Infante    TYMPANOSTOMY  07/03/2001    Dr. Peralta   ,   Family History   Problem Relation Age of Onset    Diabetes Mother     Heart attack Maternal Grandfather     Hypertension Maternal Grandfather     Diabetes Maternal Grandfather     Lung cancer Other     Colon cancer Neg Hx     Colon polyps Neg Hx    ,   Social History     Tobacco Use    Smoking status: Every Day     Types: Electronic Cigarette    Smokeless tobacco: Never   Vaping Use    Vaping status: Every Day    Substances: Nicotine, Flavoring    Devices: Disposable, Pre-filled or refillable cartridge, Pre-filled pod    Passive vaping exposure: Yes   Substance Use Topics    Alcohol use: Yes     Comment: social    Drug use: Yes     Types: Marijuana   , (Not in a hospital admission)   and Allergies:  Mold extract [trichophyton]    Current Outpatient Medications:     esomeprazole (nexIUM) 40 MG capsule, Take 1 capsule by mouth 2 (Two) Times a Day., Disp: 60 capsule, Rfl: 11    fluticasone (FLONASE) 50 MCG/ACT nasal spray, 2 sprays into the nostril(s) as directed by provider 2 (Two) Times a Day., Disp: 48 g, Rfl: 3    loratadine (CLARITIN) 10 MG tablet, Take 1 tablet by mouth., Disp: , Rfl:     sucralfate (Carafate) 1 g tablet, Take 1 tablet by mouth 4 (Four) Times a Day., Disp: 120 tablet, Rfl: 3    Objective     Vital Signs   Temp:  [99.3 °F (37.4 °C)] 99.3 °F (37.4 °C)  Heart Rate:  [74] 74  BP: (119)/(81) 119/81    Physical  Exam:  Physical Exam  Constitutional:       General: He is not in acute distress.     Appearance: He is obese. He is not ill-appearing.   Cardiovascular:      Rate and Rhythm: Normal rate and regular rhythm.   Pulmonary:      Effort: Pulmonary effort is normal.      Breath sounds: Normal breath sounds.   Abdominal:      General: Abdomen is flat. Bowel sounds are normal. There is no distension.      Palpations: Abdomen is soft.      Tenderness: There is no abdominal tenderness.   Neurological:      Mental Status: He is alert.        Results Review:     Lab Results (last 24 hours)       ** No results found for the last 24 hours. **          Imaging Results (Last 24 Hours)       ** No results found for the last 24 hours. **            ASSESSMENT/PLAN   Diagnosis Plan   1. Hiatal hernia with gastroesophageal reflux disease and esophagitis        2. Vapes nicotine containing substance               The patient and I discussed his manometry and possible pH studies for a hiatal hernia repair with fundoplication.    He has chosen laparoscopic approach with robotic assist for a paraesophageal hernia repair and an antireflux procedure. I agree with this decision.  I have discussed alternatives which include continued different degrees of fundoplication such as the Nissen, medical therapy, endoscopic approaches and behavioral changes provide the alternatives.  We discussed the benefits of the surgeries including the benefit improvement of GERD symptoms and injury to the distal esophagus due to reflux.  He is aware that partial fundoplication procedure is difficult to reversible.  We discussed the complications and risks which include the risk of perforation, leakage,bleeding, intra-abdominal organ injury, specifically the esophagus and stomach as well as the risks of injuring the liver and spleen.  There is a risk of injury to the lungs and death.  I explained to the patient that there is a risk of infection, bleeding as well  as specifically relating to the antireflux procedure the risks of abdominal bloating, difficulty swallowing or belching, nausea and diarrhea.  I explained to him the possibility that this procedure may not be performed laparoscopically and may require being converted to an opened procedure or aborted due to abnormal anatomy.    There may also be associated risk of increased weight gain due to tolerating more foods that they were unable to tolerate prior to the procedure.    I explained to the patient that the success of the surgery is also related to the motivation and dedication to behavioral changes that they have learned during their postoperative course.        Ezekiel Palmer understands the surgical procedures and the different surgical options that are available.   Ezekiel Palmer understands the lifestyle changes that are required after surgery and has agreed to follow the guidelines outlined in the weight management program and to stop the use of nicotine products due to the increased risk of recurrence and other postoperative complications. Ezekiel Palmer also expressed understanding of the risks involved and had all of his questions answered and desires to proceed.    He will be scheduled accordingly for a laparoscopic paraesophageal hernia repair with antireflux procedure pending a negative nicotine resolved due to his use of nicotine vape products.      Ankit Norwood MD  07/22/24  14:22 CDT

## 2024-08-26 ENCOUNTER — OFFICE VISIT (OUTPATIENT)
Dept: BARIATRICS/WEIGHT MGMT | Facility: CLINIC | Age: 24
End: 2024-08-26
Payer: COMMERCIAL

## 2024-08-26 VITALS
BODY MASS INDEX: 34.56 KG/M2 | OXYGEN SATURATION: 97 % | HEIGHT: 67 IN | DIASTOLIC BLOOD PRESSURE: 80 MMHG | WEIGHT: 220.2 LBS | SYSTOLIC BLOOD PRESSURE: 119 MMHG | TEMPERATURE: 98.7 F | HEART RATE: 84 BPM

## 2024-08-26 DIAGNOSIS — K44.9 HIATAL HERNIA WITH GASTROESOPHAGEAL REFLUX DISEASE AND ESOPHAGITIS: ICD-10-CM

## 2024-08-26 DIAGNOSIS — Z72.0 VAPES NICOTINE CONTAINING SUBSTANCE: Primary | ICD-10-CM

## 2024-08-26 DIAGNOSIS — K21.00 HIATAL HERNIA WITH GASTROESOPHAGEAL REFLUX DISEASE AND ESOPHAGITIS: ICD-10-CM

## 2024-08-26 PROCEDURE — 1160F RVW MEDS BY RX/DR IN RCRD: CPT | Performed by: SURGERY

## 2024-08-26 PROCEDURE — 99213 OFFICE O/P EST LOW 20 MIN: CPT | Performed by: SURGERY

## 2024-08-26 PROCEDURE — 1159F MED LIST DOCD IN RCRD: CPT | Performed by: SURGERY

## 2024-08-26 NOTE — PROGRESS NOTES
General Surgery   History and Physical     Referring Provider: No ref. provider found    Patient Care Team:  Jazzy Infante MD as PCP - General (Family Medicine)  Bill Rasheed MD as Consulting Physician (Gastroenterology)    Chief complaint: GERD    Subjective      History of present illness:  The patient is a 23 y.o. male presents with a hiatal hernia and symptoms of GERD.   Ezekiel Hall Palmer several years ago and subsequently undergone an EGD with findings of a hiatal hernia and esophagitis.  He is since changed his behavior with respect to stopping the use of nicotine products, changing his diet and increasing healthier choices.  He states that his symptoms have improved however there are certain foods that still trigger his GERD and heartburn symptoms such as greasy fatty foods, acidic foods and foods like kiwi.    Review of Systems    Review of Systems - General ROS: negative  ENT ROS: negative  Respiratory ROS: no cough, shortness of breath, or wheezing  Cardiovascular ROS: no chest pain or dyspnea on exertion  Gastrointestinal ROS: no abdominal pain, change in bowel habits, or black or bloody stools  positive for - heartburn    History  Past Medical History:   Diagnosis Date    Acute gastroenteritis     Allergic rhinitis     Diarrhea     Exposure to tobacco smoke     Gastroenteritis     GERD (gastroesophageal reflux disease)     Headache     Nausea and vomiting     with body aches    Rhinitis     Tachycardia     Upper respiratory infection     Well child check    ,   Past Surgical History:   Procedure Laterality Date    DENTAL PROCEDURE  07/03/2001    frenuloplasty Dr. Peralta    ENDOSCOPY N/A 7/28/2017    Procedure: ESOPHAGOGASTRODUODENOSCOPY possible dilation ;  Surgeon: Rodney Huber MD;  [facility];  Service:     ENDOSCOPY N/A 11/16/2020    Procedure: ESOPHAGOGASTRODUODENOSCOPY WITH ANESTHESIA;  Surgeon: iBll Rasheed MD;  Location: [facility] ENDOSCOPY;  Service:  Gastroenterology;  Laterality: N/A;  pre op: gerd  post op:hiatal hernia, esophagitis  PCP: Jazzy Infante MD    ENDOSCOPY N/A 2023    Procedure: ESOPHAGOGASTRODUODENOSCOPY WITH ANESTHESIA;  Surgeon: Bill Rasheed MD;  Location:  [facility] ENDOSCOPY;  Service: Gastroenterology;  Laterality: N/A;  preop: nausa; right upper quadrant pain  post op: esophagitis; hiatal hernia   PCP:  Jazzy Infante MD     ENDOSCOPY N/A 2024    Procedure: ESOPHAGOGASTRODUODENOSCOPY WITH ANESTHESIA;  Surgeon: Bill Rasheed MD;  Location:  [facility] ENDOSCOPY;  Service: Gastroenterology;  Laterality: N/A;  pre esophagitis  post esophagitis  Dr. Infante    TYMPANOSTOMY  2001    Dr. Peralta   ,   Family History   Problem Relation Age of Onset    Diabetes Mother     Heart attack Maternal Grandfather     Hypertension Maternal Grandfather     Diabetes Maternal Grandfather     Lung cancer Other     Colon cancer Neg Hx     Colon polyps Neg Hx    ,   Social History     Tobacco Use    Smoking status: Former     Types: Electronic Cigarette     Quit date: 2024     Years since quittin.0    Smokeless tobacco: Never   Vaping Use    Vaping status: Every Day    Substances: Nicotine, Flavoring    Devices: Disposable, Pre-filled or refillable cartridge, Pre-filled pod    Passive vaping exposure: Yes   Substance Use Topics    Alcohol use: Yes     Comment: social    Drug use: Yes     Types: Marijuana   , (Not in a hospital admission)   and Allergies:  Mold extract [trichophyton]    Current Outpatient Medications:     esomeprazole (nexIUM) 40 MG capsule, Take 1 capsule by mouth 2 (Two) Times a Day., Disp: 60 capsule, Rfl: 11    fluticasone (FLONASE) 50 MCG/ACT nasal spray, 2 sprays into the nostril(s) as directed by provider 2 (Two) Times a Day., Disp: 48 g, Rfl: 3    loratadine (CLARITIN) 10 MG tablet, Take 1 tablet by mouth., Disp: , Rfl:     sucralfate (Carafate) 1 g tablet, Take 1 tablet by mouth 4 (Four) Times a Day.,  Disp: 120 tablet, Rfl: 3    Objective     Vital Signs   Temp:  [98.7 °F (37.1 °C)] 98.7 °F (37.1 °C)  Heart Rate:  [84] 84  BP: (119)/(80) 119/80    Physical Exam:  Physical Exam  Constitutional:       General: He is not in acute distress.     Appearance: Normal appearance. He is obese. He is not ill-appearing.   HENT:      Head: Normocephalic.   Cardiovascular:      Rate and Rhythm: Normal rate and regular rhythm.   Pulmonary:      Effort: Pulmonary effort is normal.      Breath sounds: Normal breath sounds.   Abdominal:      General: Abdomen is flat. Bowel sounds are normal.      Palpations: Abdomen is soft.   Neurological:      Mental Status: He is alert.          Results Review:     Lab Results (last 24 hours)       ** No results found for the last 24 hours. **          Imaging Results (Last 24 Hours)       ** No results found for the last 24 hours. **            ASSESSMENT/PLAN   Diagnosis Plan   1. Vapes nicotine containing substance  Nicotine Screen, Urine - Urine, Clean Catch      2. Hiatal hernia with gastroesophageal reflux disease and esophagitis        Manometry showed no contraindications for an antireflux procedure.  I explained to the patient that hiatal hernia repairs to have a significant recurrence rate and because of his age there is a high probability that it might present itself again within the next 10 to 20 years.  He would require to continue with nicotine cessation, decrease his weight to help decrease his risks of recurrence.  Behavioral changes and incorporating dieting and exercise consistently.  Though he is aware of these risks of recurrence he would still like to have surgery.  As a result I will refer him to my colleague Dr. Puente for consideration of a hiatal hernia repair.  Of note he has stopped use of nicotine products per his statement.  He will be acquiring a nicotine test to confirm.  He will continue with conservative management until this time with Dr. Puente for consideration  of surgery.        Ankit Norwood MD  08/26/24  10:20 CDT

## 2024-10-15 ENCOUNTER — OFFICE VISIT (OUTPATIENT)
Dept: GASTROENTEROLOGY | Facility: CLINIC | Age: 24
End: 2024-10-15
Payer: COMMERCIAL

## 2024-10-15 VITALS
SYSTOLIC BLOOD PRESSURE: 108 MMHG | HEIGHT: 67 IN | BODY MASS INDEX: 33.74 KG/M2 | TEMPERATURE: 97.8 F | OXYGEN SATURATION: 99 % | DIASTOLIC BLOOD PRESSURE: 78 MMHG | HEART RATE: 67 BPM | WEIGHT: 215 LBS

## 2024-10-15 DIAGNOSIS — K20.90 ESOPHAGITIS: Primary | ICD-10-CM

## 2024-10-15 DIAGNOSIS — K21.00 GASTROESOPHAGEAL REFLUX DISEASE WITH ESOPHAGITIS WITHOUT HEMORRHAGE: ICD-10-CM

## 2024-10-15 NOTE — PROGRESS NOTES
[facility] GASTROENTEROLOGY - OFFICE NOTE    10/15/2024    Ezekiel Palmer   2000    Primary Physician: Jazzy Infante MD    Chief Complaint   Patient presents with    Follow-up     Post endoscopy   Esophagitis         HISTORY OF PRESENT ILLNESS:     Ezekiel Palmer is a 23 y.o. male presents for follow up esophagitis and gerd. Gerd is manifested by regurgitation 3-4 days per week.  He is taking Nexium and Carafate daily.  No dysphagia. No n/v.         He saw Dr. SHAWN Norwood. Esophageal manometry was done. Dr. Norwood referred to Dr. Puente for consideration for hiatal hernia repair. Dr. Rasheed had recommended repeat egd 3 mo after surgery to assess for further esophagitis and check for dinero's esophagus.       UPPER GI ENDOSCOPY (02/20/2024 13:55)   Tissue Pathology Exam (02/20/2024 14:04)     Past Medical History:   Diagnosis Date    Acute gastroenteritis     Allergic rhinitis     Diarrhea     Exposure to tobacco smoke     Gastroenteritis     GERD (gastroesophageal reflux disease)     Headache     Nausea and vomiting     with body aches    Rhinitis     Tachycardia     Upper respiratory infection     Well child check        Past Surgical History:   Procedure Laterality Date    DENTAL PROCEDURE  07/03/2001    frenuloplasty Dr. Peralta    ENDOSCOPY N/A 7/28/2017    Procedure: ESOPHAGOGASTRODUODENOSCOPY possible dilation ;  Surgeon: Rodney Huber MD;  Location: [facility] ENDOSCOPY;  Service:     ENDOSCOPY N/A 11/16/2020    Procedure: ESOPHAGOGASTRODUODENOSCOPY WITH ANESTHESIA;  Surgeon: Bill Rasheed MD;  Location: [facility] ENDOSCOPY;  Service: Gastroenterology;  Laterality: N/A;  pre op: gerd  post op:hiatal hernia, esophagitis  PCP: Jazzy Infante MD    ENDOSCOPY N/A 5/9/2023    Procedure: ESOPHAGOGASTRODUODENOSCOPY WITH ANESTHESIA;  Surgeon: Bill Rasheed MD;  Location: [facility] ENDOSCOPY;  Service: Gastroenterology;  Laterality: N/A;  preop: nausa; right upper quadrant  pain  post op: esophagitis; hiatal hernia   PCP:  Jazzy Infante MD     ENDOSCOPY N/A 2024    Procedure: ESOPHAGOGASTRODUODENOSCOPY WITH ANESTHESIA;  Surgeon: Bill Rasheed MD;  Location: [facility] ENDOSCOPY;  Service: Gastroenterology;  Laterality: N/A;  pre esophagitis  post esophagitis  Dr. Infante    TYMPANOSTOMY  2001    Dr. Peralta       Outpatient Medications Marked as Taking for the 10/15/24 encounter (Office Visit) with Ro Hill APRN   Medication Sig Dispense Refill    esomeprazole (nexIUM) 40 MG capsule Take 1 capsule by mouth 2 (Two) Times a Day. 60 capsule 11    fluticasone (FLONASE) 50 MCG/ACT nasal spray 2 sprays into the nostril(s) as directed by provider 2 (Two) Times a Day. 48 g 3    loratadine (CLARITIN) 10 MG tablet Take 1 tablet by mouth As Needed.      sucralfate (Carafate) 1 g tablet Take 1 tablet by mouth 4 (Four) Times a Day. 120 tablet 3       Allergies   Allergen Reactions    Mold Extract [Trichophyton] Other (See Comments)     fever       Social History     Socioeconomic History    Marital status: Significant Other   Tobacco Use    Smoking status: Former     Types: Electronic Cigarette     Quit date: 2024     Years since quittin.2    Smokeless tobacco: Never   Vaping Use    Vaping status: Every Day    Substances: Nicotine, Flavoring    Devices: Disposable, Pre-filled or refillable cartridge, Pre-filled pod    Passive vaping exposure: Yes   Substance and Sexual Activity    Alcohol use: Yes     Comment: social    Drug use: Yes     Types: Marijuana    Sexual activity: Defer       Family History   Problem Relation Age of Onset    Diabetes Mother     Heart attack Maternal Grandfather     Hypertension Maternal Grandfather     Diabetes Maternal Grandfather     Lung cancer Other     Colon cancer Neg Hx     Colon polyps Neg Hx        Review of Systems   Constitutional:  Negative for chills, fever and unexpected weight change.   Respiratory:  Negative for shortness of  "breath.    Cardiovascular:  Negative for chest pain.   Gastrointestinal:  Negative for abdominal distention, abdominal pain, anal bleeding, blood in stool, constipation, diarrhea, nausea and vomiting.        Vitals:    10/15/24 0937   BP: 108/78   Pulse: 67   Temp: 97.8 °F (36.6 °C)   SpO2: 99%   Weight: 97.5 kg (215 lb)   Height: 170.2 cm (67\")      Body mass index is 33.67 kg/m².    Physical Exam  Vitals reviewed.   Constitutional:       General: He is not in acute distress.  Cardiovascular:      Rate and Rhythm: Normal rate and regular rhythm.      Heart sounds: Normal heart sounds.   Pulmonary:      Effort: Pulmonary effort is normal.      Breath sounds: Normal breath sounds.   Abdominal:      General: Bowel sounds are normal. There is no distension.      Palpations: Abdomen is soft.      Tenderness: There is no abdominal tenderness.   Skin:     General: Skin is warm and dry.   Neurological:      Mental Status: He is alert.         Results for orders placed or performed during the hospital encounter of 05/30/24   POC Rapid Strep A    Collection Time: 05/30/24  3:05 PM    Specimen: Swab   Result Value Ref Range    Rapid Strep A Screen Negative     Internal Control Passed     Lot Number [id number]     Expiration Date 7,202,025    POCT SARS-CoV-2 Antigen ([id number])    Collection Time: 05/30/24  3:12 PM    Specimen: Nasopharynx; Swab   Result Value Ref Range    SARS Antigen Not Detected Not Detected, Presumptive Negative    Internal Control Passed Passed    Lot Number [id number]     Expiration Date 7,222,024            ASSESSMENT AND PLAN    Assessment & Plan     Diagnoses and all orders for this visit:    1. Esophagitis (Primary)    2. Gastroesophageal reflux disease with esophagitis without hemorrhage    I would recommend continue with strict antireflux precautions.  He recently had upper endoscopy with LA grade D reflux esophagitis despite PPI twice daily and Carafate daily.  He has had persistent esophagitis dating " back to 2017.  Dr. Rasheed recommended referral to Dr. Ankit Norwood.  I reviewed his most recent office visit note August 2024.  The patient is interested in pursuing nissen fundoplication and Dr. Ankit Norwood has referred him to Dr. Puente. The patient does not have a date at this time. I recommended that he contact Dr. Norwood office to see if referral was made. He will let me know. I recommend he continue ppi for now. He will need repeat egd 3-4 mo after nissen. Office visit here 6 mo.     * Surgery not found *           Return in about 6 months (around 4/15/2025).          There are no Patient Instructions on file for this visit.      Ro Hill, APRN

## 2024-11-21 ENCOUNTER — OFFICE VISIT (OUTPATIENT)
Dept: SURGERY | Facility: CLINIC | Age: 24
End: 2024-11-21
Payer: COMMERCIAL

## 2024-11-21 ENCOUNTER — PATIENT ROUNDING (BHMG ONLY) (OUTPATIENT)
Dept: SURGERY | Facility: CLINIC | Age: 24
End: 2024-11-21
Payer: COMMERCIAL

## 2024-11-21 VITALS
HEIGHT: 67 IN | WEIGHT: 199.2 LBS | SYSTOLIC BLOOD PRESSURE: 116 MMHG | DIASTOLIC BLOOD PRESSURE: 88 MMHG | BODY MASS INDEX: 31.27 KG/M2 | HEART RATE: 102 BPM | OXYGEN SATURATION: 98 %

## 2024-11-21 DIAGNOSIS — K44.9 HIATAL HERNIA WITH GASTROESOPHAGEAL REFLUX DISEASE AND ESOPHAGITIS: Primary | ICD-10-CM

## 2024-11-21 DIAGNOSIS — K21.00 HIATAL HERNIA WITH GASTROESOPHAGEAL REFLUX DISEASE AND ESOPHAGITIS: Primary | ICD-10-CM

## 2024-11-27 NOTE — PROGRESS NOTES
GENERAL SURGERY OFFICE NEW PATIENT HISTORY AND PHYSICAL    Referring Provider: Ankit Norwood MD  Primary Care Provider: Jazzy Infante MD    Chief Complaint   Patient presents with    Hernia       Subjective .     History of present illness:  Ezekiel Palmer is a 23 y.o. male who presents for a second opinion regarding a hiatal hernia and reflux.  His reflux symptoms are somewhat controlled on esomeprazole which she is compliant with.  He is a former smoker but does vape.  He has had some improvement with dietary changes.  He does drink caffeine and occasionally drinks alcohol.    BMI is 31.  He is not on aspirin, Plavix or anticoagulation.      History:  Past Medical History:   Diagnosis Date    Acute gastroenteritis     Allergic rhinitis     Diarrhea     Exposure to tobacco smoke     Gastroenteritis     GERD (gastroesophageal reflux disease)     Headache     Nausea and vomiting     with body aches    Rhinitis     Tachycardia     Upper respiratory infection     Well child check    ,   Past Surgical History:   Procedure Laterality Date    DENTAL PROCEDURE  07/03/2001    frenuloplasty Dr. Peralta    ENDOSCOPY N/A 7/28/2017    Procedure: ESOPHAGOGASTRODUODENOSCOPY possible dilation ;  Surgeon: Rodney Huber MD;  Location: [facility] ENDOSCOPY;  Service:     ENDOSCOPY N/A 11/16/2020    Procedure: ESOPHAGOGASTRODUODENOSCOPY WITH ANESTHESIA;  Surgeon: Bill Rasheed MD;  Location: [facility] ENDOSCOPY;  Service: Gastroenterology;  Laterality: N/A;  pre op: gerd  post op:hiatal hernia, esophagitis  PCP: Jazzy Infante MD    ENDOSCOPY N/A 5/9/2023    Procedure: ESOPHAGOGASTRODUODENOSCOPY WITH ANESTHESIA;  Surgeon: Bill Rasheed MD;  Location: [facility] ENDOSCOPY;  Service: Gastroenterology;  Laterality: N/A;  preop: nausa; right upper quadrant pain  post op: esophagitis; hiatal hernia   PCP:  Jazzy Infante MD     ENDOSCOPY N/A 2/20/2024    Procedure: ESOPHAGOGASTRODUODENOSCOPY WITH  "ANESTHESIA;  Surgeon: Bill Rasheed MD;  Location: [facility] ENDOSCOPY;  Service: Gastroenterology;  Laterality: N/A;  pre esophagitis  post esophagitis  Dr. Infante    TYMPANOSTOMY  2001    Dr. Peralta   ,   Family History   Problem Relation Age of Onset    Diabetes Mother     Heart attack Maternal Grandfather     Hypertension Maternal Grandfather     Diabetes Maternal Grandfather     Lung cancer Other     Colon cancer Neg Hx     Colon polyps Neg Hx    ,   Social History     Tobacco Use    Smoking status: Former     Types: Electronic Cigarette     Quit date: 2024     Years since quittin.3    Smokeless tobacco: Never   Vaping Use    Vaping status: Every Day    Substances: Nicotine, Flavoring    Devices: Disposable, Pre-filled or refillable cartridge, Pre-filled pod    Passive vaping exposure: Yes   Substance Use Topics    Alcohol use: Yes     Comment: social    Drug use: Yes     Types: Marijuana       Current Outpatient Medications:     esomeprazole (nexIUM) 40 MG capsule, Take 1 capsule by mouth 2 (Two) Times a Day., Disp: 60 capsule, Rfl: 11    fluticasone (FLONASE) 50 MCG/ACT nasal spray, 2 sprays into the nostril(s) as directed by provider 2 (Two) Times a Day., Disp: 48 g, Rfl: 3    loratadine (CLARITIN) 10 MG tablet, Take 1 tablet by mouth As Needed., Disp: , Rfl:     sucralfate (Carafate) 1 g tablet, Take 1 tablet by mouth 4 (Four) Times a Day., Disp: 120 tablet, Rfl: 3    Allergies:  Mold extract [trichophyton]      The following portions of the patient's history were reviewed and updated as appropriate: allergies, current medications, past family history, past medical history, past social history, past surgical history, and problem list.      Review of Systems  A comprehensive 14 point review of systems was performed and is negative unless otherwise noted      Objective   /88   Pulse 102   Ht 170.2 cm (67\")   Wt 90.4 kg (199 lb 3.2 oz)   SpO2 98%   BMI 31.20 kg/m²     BMI followup " discussion/instruction with patient: continue with current weight loss program, educational material, exercise counseling, nutrition counseling, and Information on healthy weight added to patient's after visit summary.      Physical Exam  Constitutional:       Appearance: Normal appearance. He is normal weight.      Comments: Adult male, in no acute distress. Normal development, normal body habitus. Well nourished   HENT:      Head: Normocephalic and atraumatic.      Right Ear: External ear normal.      Left Ear: External ear normal.      Ears:      Comments: Hearing intact     Nose: Nose normal.      Comments: Nares patent, no septal deviation, no drainage     Mouth/Throat:      Comments: Airway patent, dentition intact, mucus membranes moist  Eyes:      Extraocular Movements: Extraocular movements intact.      Conjunctiva/sclera: Conjunctivae normal.      Pupils: Pupils are equal, round, and reactive to light.      Comments: External eyelids grossly normal, vision intact, no scleral icterus   Neck:      Comments: Trachea midline  Cardiovascular:      Rate and Rhythm: Normal rate.      Comments: Normotensive, no JVD bilaterally  Pulmonary:      Effort: Pulmonary effort is normal.      Breath sounds: Normal breath sounds.      Comments: Normal respiratory rate  Abdominal:      General: Abdomen is flat.      Palpations: Abdomen is soft.      Comments: Non tender. No scars, hernias or masses.   Musculoskeletal:         General: Normal range of motion.      Cervical back: Normal range of motion.      Comments: Strength intact. Ambulating without difficulty. Absent of trauma   Skin:     General: Skin is warm and dry.      Comments: Skin color is consistent with ethnicity   Neurological:      General: No focal deficit present.      Mental Status: He is alert and oriented to person, place, and time.   Psychiatric:         Mood and Affect: Mood normal.         Behavior: Behavior normal.         Thought Content: Thought  content normal.         Judgment: Judgment normal.      Comments: Patient understands disease process and has decision making capacity.              Results:  Labs:  I personally reviewed all pertinent labs.   Imaging: I personally reviewed all pertinent imaging studies.   Pathology:        Assessment & Plan   Diagnoses and all orders for this visit:    1. Hiatal hernia with gastroesophageal reflux disease and esophagitis (Primary)  -     FL ESOPHAGRAM SINGLE CONTRAST; Future    23-year-old male with a hiatal hernia and GERD.  The patient is instituting therapeutic lifestyle changes and despite some improvement, he is still experiencing symptoms.  He is going to continue working on improving his diet and cutting back on vaping.  In the meantime, I will obtain a barium esophagram.  I will see him back in 4 to 6 weeks.  Sooner if needed.             Thank you for the referral and trusting me with the care of your patient.    Rodney Puente MD, [facility]  General Surgery  11/27/2024  11:59 CST    Please note that portions of this note were completed with a voice recognition program.

## 2024-12-17 ENCOUNTER — TELEPHONE (OUTPATIENT)
Dept: SURGERY | Facility: CLINIC | Age: 24
End: 2024-12-17

## 2024-12-17 NOTE — TELEPHONE ENCOUNTER
Caller: Ezkeiel Palmer     Relationship: SELF     Best call back number: [phone]     What is the best time to reach you:  PATIENT WORKS 7AM  UNTIL 5:30PM    Who are you requesting to speak with (clinical staff, provider,  specific staff member): DOESN'T KNOW     Do you know the name of the person who called: DONT KNOW     What was the call regarding: DONT KNOW     Is it okay if the provider responds through MyChart:  PREFERS MYCHART

## 2024-12-18 DIAGNOSIS — K21.00 GASTROESOPHAGEAL REFLUX DISEASE WITH ESOPHAGITIS WITHOUT HEMORRHAGE: Primary | ICD-10-CM

## 2024-12-18 RX ORDER — ESOMEPRAZOLE MAGNESIUM 40 MG/1
40 CAPSULE, DELAYED RELEASE ORAL 2 TIMES DAILY
Qty: 60 CAPSULE | Refills: 11 | Status: CANCELLED | OUTPATIENT
Start: 2024-12-18

## 2024-12-19 RX ORDER — ESOMEPRAZOLE MAGNESIUM 40 MG/1
40 CAPSULE, DELAYED RELEASE ORAL 2 TIMES DAILY
Qty: 60 CAPSULE | Refills: 11 | Status: SHIPPED | OUTPATIENT
Start: 2024-12-19

## 2024-12-19 NOTE — TELEPHONE ENCOUNTER
Rx Refill Note  Requested Prescriptions     Pending Prescriptions Disp Refills    esomeprazole (nexIUM) 40 MG capsule 60 capsule 11     Sig: Take 1 capsule by mouth 2 (Two) Times a Day.      Last office visit with prescribing clinician: 10/15/2024    Last telemedicine visit with prescribing clinician: Visit date not found     Next office visit with prescribing clinician: 04/15/2025          Would you like a call back once the refill request has been completed: [] Yes [] No    If the office needs to give you a call back, can they leave a voicemail: [] Yes [] No    Carie Toledo MA  12/19/24, 08:38 CST

## 2025-01-14 ENCOUNTER — TELEPHONE (OUTPATIENT)
Dept: SURGERY | Facility: CLINIC | Age: 25
End: 2025-01-14
Payer: COMMERCIAL

## 2025-01-14 NOTE — TELEPHONE ENCOUNTER
Called and left message for patient to schedule his study.   Will need to reschedule appt with Dr. Puente until after the esophagram is done.

## 2025-01-14 NOTE — TELEPHONE ENCOUNTER
12/17/2402:09:47 PM  Kamila Ascencio MA  Open Communication    Communication          Comments    Called patient to schedule appt.

## 2025-01-20 ENCOUNTER — HOSPITAL ENCOUNTER (OUTPATIENT)
Dept: GENERAL RADIOLOGY | Facility: HOSPITAL | Age: 25
Discharge: HOME | End: 2025-01-20
Admitting: SURGERY
Payer: COMMERCIAL

## 2025-01-20 DIAGNOSIS — K44.9 HIATAL HERNIA WITH GASTROESOPHAGEAL REFLUX DISEASE AND ESOPHAGITIS: ICD-10-CM

## 2025-01-20 DIAGNOSIS — K21.00 HIATAL HERNIA WITH GASTROESOPHAGEAL REFLUX DISEASE AND ESOPHAGITIS: ICD-10-CM

## 2025-01-20 PROCEDURE — 74220 X-RAY XM ESOPHAGUS 1CNTRST: CPT

## 2025-01-20 RX ADMIN — ANTACID/ANTIFLATULENT 1 PACKET: 380; 550; 10; 10 GRANULE, EFFERVESCENT ORAL at 10:19

## 2025-01-20 RX ADMIN — BARIUM SULFATE 120 ML: 980 POWDER, FOR SUSPENSION ORAL at 10:18

## 2025-01-31 ENCOUNTER — OFFICE VISIT (OUTPATIENT)
Dept: FAMILY MEDICINE CLINIC | Facility: CLINIC | Age: 25
End: 2025-01-31
Payer: COMMERCIAL

## 2025-01-31 VITALS
HEIGHT: 67 IN | BODY MASS INDEX: 30.92 KG/M2 | OXYGEN SATURATION: 98 % | WEIGHT: 197 LBS | DIASTOLIC BLOOD PRESSURE: 83 MMHG | SYSTOLIC BLOOD PRESSURE: 120 MMHG | RESPIRATION RATE: 20 BRPM | HEART RATE: 98 BPM | TEMPERATURE: 98 F

## 2025-01-31 DIAGNOSIS — K44.9 HIATAL HERNIA WITH GERD: Primary | ICD-10-CM

## 2025-01-31 DIAGNOSIS — K21.9 HIATAL HERNIA WITH GERD: Primary | ICD-10-CM

## 2025-01-31 PROCEDURE — 99213 OFFICE O/P EST LOW 20 MIN: CPT | Performed by: FAMILY MEDICINE

## 2025-01-31 PROCEDURE — 1126F AMNT PAIN NOTED NONE PRSNT: CPT | Performed by: FAMILY MEDICINE
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
## 2025-02-04 ENCOUNTER — OFFICE VISIT (OUTPATIENT)
Dept: SURGERY | Facility: CLINIC | Age: 25
End: 2025-02-04
Payer: COMMERCIAL

## 2025-02-04 VITALS
HEIGHT: 67 IN | WEIGHT: 197 LBS | DIASTOLIC BLOOD PRESSURE: 72 MMHG | BODY MASS INDEX: 30.92 KG/M2 | SYSTOLIC BLOOD PRESSURE: 118 MMHG

## 2025-02-04 DIAGNOSIS — K20.90 ESOPHAGITIS: ICD-10-CM

## 2025-02-04 DIAGNOSIS — K44.9 HIATAL HERNIA WITH GASTROESOPHAGEAL REFLUX DISEASE AND ESOPHAGITIS: Primary | ICD-10-CM

## 2025-02-04 DIAGNOSIS — K82.8 BILIARY DYSKINESIA: ICD-10-CM

## 2025-02-04 DIAGNOSIS — K21.00 HIATAL HERNIA WITH GASTROESOPHAGEAL REFLUX DISEASE AND ESOPHAGITIS: Primary | ICD-10-CM

## 2025-02-04 RX ORDER — GABAPENTIN 100 MG/1
300 CAPSULE ORAL 3 TIMES DAILY
OUTPATIENT
Start: 2025-02-04

## 2025-02-04 RX ORDER — SODIUM CHLORIDE 9 MG/ML
40 INJECTION, SOLUTION INTRAVENOUS AS NEEDED
OUTPATIENT
Start: 2025-02-04

## 2025-02-04 RX ORDER — ONDANSETRON 2 MG/ML
4 INJECTION INTRAMUSCULAR; INTRAVENOUS EVERY 6 HOURS PRN
OUTPATIENT
Start: 2025-02-04

## 2025-02-04 RX ORDER — SODIUM CHLORIDE 0.9 % (FLUSH) 0.9 %
10 SYRINGE (ML) INJECTION AS NEEDED
OUTPATIENT
Start: 2025-02-04

## 2025-02-04 RX ORDER — CELECOXIB 100 MG/1
200 CAPSULE ORAL ONCE
OUTPATIENT
Start: 2025-02-04 | End: 2025-02-04

## 2025-02-04 RX ORDER — ACETAMINOPHEN 325 MG/1
650 TABLET ORAL ONCE
OUTPATIENT
Start: 2025-02-04 | End: 2025-02-04

## 2025-02-04 RX ORDER — SODIUM CHLORIDE 0.9 % (FLUSH) 0.9 %
10 SYRINGE (ML) INJECTION EVERY 12 HOURS SCHEDULED
OUTPATIENT
Start: 2025-02-04

## 2025-02-04 NOTE — H&P (VIEW-ONLY)
GENERAL SURGERY OFFICE FOLLOW UP VISIT    Referring Provider: No ref. provider found  Primary Care Provider: Jazzy Infante MD    Chief Complaint   Patient presents with    Hernia       Subjective     Mr. Palmer returns to clinic for follow-up after undergoing a barium esophagram for his hiatal hernia and GERD.  He reports that his reflux symptoms are well-controlled on Nexium.    History:  Past Medical History:   Diagnosis Date    Acute gastroenteritis     Allergic rhinitis     Diarrhea     Exposure to tobacco smoke     Gastroenteritis     GERD (gastroesophageal reflux disease)     Headache     Nausea and vomiting     with body aches    Rhinitis     Tachycardia     Upper respiratory infection     Well child check    ,   Past Surgical History:   Procedure Laterality Date    DENTAL PROCEDURE  07/03/2001    frenuloplasty Dr. Peralta    ENDOSCOPY N/A 7/28/2017    Procedure: ESOPHAGOGASTRODUODENOSCOPY possible dilation ;  Surgeon: Rodney Huber MD;  Location: [facility] ENDOSCOPY;  Service:     ENDOSCOPY N/A 11/16/2020    Procedure: ESOPHAGOGASTRODUODENOSCOPY WITH ANESTHESIA;  Surgeon: Bill Rasheed MD;  Location: [facility] ENDOSCOPY;  Service: Gastroenterology;  Laterality: N/A;  pre op: gerd  post op:hiatal hernia, esophagitis  PCP: Jazzy Infante MD    ENDOSCOPY N/A 5/9/2023    Procedure: ESOPHAGOGASTRODUODENOSCOPY WITH ANESTHESIA;  Surgeon: Bill Rasheed MD;  Location: [facility] ENDOSCOPY;  Service: Gastroenterology;  Laterality: N/A;  preop: nausa; right upper quadrant pain  post op: esophagitis; hiatal hernia   PCP:  Jazzy Infante MD     ENDOSCOPY N/A 2/20/2024    Procedure: ESOPHAGOGASTRODUODENOSCOPY WITH ANESTHESIA;  Surgeon: Bill Rasheed MD;  Location: [facility] ENDOSCOPY;  Service: Gastroenterology;  Laterality: N/A;  pre esophagitis  post esophagitis  Dr. Infante    TYMPANOSTOMY  07/03/2001    Dr. Peralta   ,   Family History   Problem Relation Age of Onset    Diabetes Mother      "Alcohol abuse Mother     Drug abuse Mother     Heart attack Maternal Grandfather     Hypertension Maternal Grandfather     Diabetes Maternal Grandfather     Lung cancer Other     Stroke Maternal Grandmother     Colon cancer Neg Hx     Colon polyps Neg Hx    ,   Social History     Tobacco Use    Smoking status: Former     Types: Electronic Cigarette     Quit date: 2024     Years since quittin.5    Smokeless tobacco: Never   Vaping Use    Vaping status: Every Day    Substances: Nicotine, Flavoring    Devices: Disposable, Pre-filled or refillable cartridge, Pre-filled pod    Passive vaping exposure: Yes   Substance Use Topics    Alcohol use: Yes     Comment: social    Drug use: Yes     Types: Marijuana       Current Outpatient Medications:     esomeprazole (nexIUM) 40 MG capsule, Take 1 capsule by mouth 2 (Two) Times a Day., Disp: 60 capsule, Rfl: 11    fluticasone (FLONASE) 50 MCG/ACT nasal spray, 2 sprays into the nostril(s) as directed by provider 2 (Two) Times a Day., Disp: 48 g, Rfl: 3    loratadine (CLARITIN) 10 MG tablet, Take 1 tablet by mouth As Needed., Disp: , Rfl:     sucralfate (Carafate) 1 g tablet, Take 1 tablet by mouth 4 (Four) Times a Day., Disp: 120 tablet, Rfl: 3    Allergies:  Mold extract [trichophyton]      The following portions of the patient's history were reviewed and updated as appropriate: allergies, current medications, past family history, past medical history, past social history, past surgical history, and problem list.      Review of Systems  A comprehensive 14 point review of systems was performed and is negative unless otherwise noted      Objective   /72   Ht 170.2 cm (67\")   Wt 89.4 kg (197 lb)   BMI 30.85 kg/m²     BMI followup discussion/instruction with patient: continue with current weight loss program      Physical Exam  Constitutional:       Appearance: Normal appearance. He is normal weight.      Comments: Adult male, in no acute distress. Normal " development, normal body habitus. Well nourished   HENT:      Head: Normocephalic and atraumatic.      Right Ear: External ear normal.      Left Ear: External ear normal.      Ears:      Comments: Hearing intact     Nose: Nose normal.      Comments: Nares patent, no septal deviation, no drainage     Mouth/Throat:      Comments: Airway patent, dentition intact, mucus membranes moist  Eyes:      Extraocular Movements: Extraocular movements intact.      Conjunctiva/sclera: Conjunctivae normal.      Pupils: Pupils are equal, round, and reactive to light.      Comments: External eyelids grossly normal, vision intact, no scleral icterus   Neck:      Comments: Trachea midline  Cardiovascular:      Rate and Rhythm: Normal rate.      Comments: Normotensive, no JVD bilaterally  Pulmonary:      Effort: Pulmonary effort is normal.      Breath sounds: Normal breath sounds.      Comments: Normal respiratory rate  Abdominal:      General: Abdomen is flat.      Palpations: Abdomen is soft.      Comments: Non tender. No scars, hernias or masses.   Musculoskeletal:         General: Normal range of motion.      Cervical back: Normal range of motion.   Skin:     General: Skin is warm and dry.      Comments: Skin color is consistent with ethnicity   Neurological:      General: No focal deficit present.      Mental Status: He is alert and oriented to person, place, and time.   Psychiatric:         Mood and Affect: Mood normal.         Behavior: Behavior normal.         Thought Content: Thought content normal.         Judgment: Judgment normal.      Comments: Patient understands disease process and has decision making capacity.            Labs:  I personally reviewed all pertinent labs.   Imaging: I personally reviewed all pertinent imaging studies.   Pathology:      Assessment & Plan   Diagnoses and all orders for this visit:    1. Hiatal hernia with gastroesophageal reflux disease and esophagitis (Primary)    2. Biliary dyskinesia  -      Case Request; Standing  -     CBC (No Diff); Future  -     Comprehensive Metabolic Panel; Future  -     ceFAZolin (ANCEF) 2,000 mg in sodium chloride 0.9 % 100 mL IVPB  -     acetaminophen (TYLENOL) tablet 650 mg  -     celecoxib (CeleBREX) capsule 200 mg  -     gabapentin (NEURONTIN) capsule 300 mg  -     sodium chloride 0.9 % flush 10 mL  -     sodium chloride 0.9 % flush 10 mL  -     sodium chloride 0.9 % infusion 40 mL  -     ondansetron (ZOFRAN) injection 4 mg  -     Case Request    3. Esophagitis    Other orders  -     Follow Anesthesia Guidelines / Protocol; Future  -     Follow Anesthesia Guidelines / Protocol; Standing  -     Verify NPO Status; Standing  -     Clip Operative Site; Standing  -     Have Patient Void Prior to Procedure; Standing  -     Verify / Perform Chlorhexidine Skin Prep; Standing  -     Provide NPO Instructions to Patient; Future  -     Chlorhexidine Skin Prep; Future  -     Perform Betadine Skin Prep; Future  -     Notify Provider - Standard; Standing  -     Place Sequential Compression Device; Standing  -     Maintain Sequential Compression Device; Standing  -     Insert Peripheral IV; Standing  -     Saline Lock & Maintain IV Access; Standing    24-year-old male with a hiatal hernia, GERD and esophagitis.  He also has biliary dyskinesia with an ejection fraction of 14%.  His reflux symptoms are well-controlled on his current regimen of Nexium.  He is not experiencing any dysphagia. He does have reproducible RUQ pain with eating, which he believes is associated with gallbladder disease therefore he would like to proceed with surgery.     Plan for laparoscopic cholecystectomy with intraoperative cholangiogram.  Ancef 2 g.  Outpatient surgery.    I discussed the risks, benefits and alternatives to cholecystectomy including risk of injury to surrounding structures specifically the bile duct, postoperative bile leaks, deep organ space and superficial skin infection, uncontrolled bleeding,  the need for blood transfusion, conversion to open surgery, incisional hernias, ongoing pain, bowel habit changes, long term drain and wound care, and the possibility of requiring further operations or procedures including endoscopy. Additionally, I counseled the patient on the risk of postoperative cardiopulmonary complications. I gave the patient a chance to ask any questions and answered them thoroughly. The patient understood the risks and was agreeable to proceeding to surgery. Consent was obtained from the patient.       Thank you for the referral and trusting me with the care of your patient.    Rodney Puente MD, Merged with [facility]  General Surgery  2/4/2025  13:39 CST    Please note that portions of this note were completed with a voice recognition program.

## 2025-02-04 NOTE — PROGRESS NOTES
GENERAL SURGERY OFFICE FOLLOW UP VISIT    Referring Provider: No ref. provider found  Primary Care Provider: Jazzy Infante MD    Chief Complaint   Patient presents with    Hernia       Subjective     Mr. Palmer returns to clinic for follow-up after undergoing a barium esophagram for his hiatal hernia and GERD.  He reports that his reflux symptoms are well-controlled on Nexium.    History:  Past Medical History:   Diagnosis Date    Acute gastroenteritis     Allergic rhinitis     Diarrhea     Exposure to tobacco smoke     Gastroenteritis     GERD (gastroesophageal reflux disease)     Headache     Nausea and vomiting     with body aches    Rhinitis     Tachycardia     Upper respiratory infection     Well child check    ,   Past Surgical History:   Procedure Laterality Date    DENTAL PROCEDURE  07/03/2001    frenuloplasty Dr. Peralta    ENDOSCOPY N/A 7/28/2017    Procedure: ESOPHAGOGASTRODUODENOSCOPY possible dilation ;  Surgeon: Rodney Huber MD;  Location: [facility] ENDOSCOPY;  Service:     ENDOSCOPY N/A 11/16/2020    Procedure: ESOPHAGOGASTRODUODENOSCOPY WITH ANESTHESIA;  Surgeon: Bill Rasheed MD;  Location: [facility] ENDOSCOPY;  Service: Gastroenterology;  Laterality: N/A;  pre op: gerd  post op:hiatal hernia, esophagitis  PCP: Jazzy Infante MD    ENDOSCOPY N/A 5/9/2023    Procedure: ESOPHAGOGASTRODUODENOSCOPY WITH ANESTHESIA;  Surgeon: Bill Rasheed MD;  Location: [facility] ENDOSCOPY;  Service: Gastroenterology;  Laterality: N/A;  preop: nausa; right upper quadrant pain  post op: esophagitis; hiatal hernia   PCP:  Jazzy Infante MD     ENDOSCOPY N/A 2/20/2024    Procedure: ESOPHAGOGASTRODUODENOSCOPY WITH ANESTHESIA;  Surgeon: Bill Rasheed MD;  Location: [facility] ENDOSCOPY;  Service: Gastroenterology;  Laterality: N/A;  pre esophagitis  post esophagitis  Dr. Infante    TYMPANOSTOMY  07/03/2001    Dr. Peralta   ,   Family History   Problem Relation Age of Onset    Diabetes Mother      "Alcohol abuse Mother     Drug abuse Mother     Heart attack Maternal Grandfather     Hypertension Maternal Grandfather     Diabetes Maternal Grandfather     Lung cancer Other     Stroke Maternal Grandmother     Colon cancer Neg Hx     Colon polyps Neg Hx    ,   Social History     Tobacco Use    Smoking status: Former     Types: Electronic Cigarette     Quit date: 2024     Years since quittin.5    Smokeless tobacco: Never   Vaping Use    Vaping status: Every Day    Substances: Nicotine, Flavoring    Devices: Disposable, Pre-filled or refillable cartridge, Pre-filled pod    Passive vaping exposure: Yes   Substance Use Topics    Alcohol use: Yes     Comment: social    Drug use: Yes     Types: Marijuana       Current Outpatient Medications:     esomeprazole (nexIUM) 40 MG capsule, Take 1 capsule by mouth 2 (Two) Times a Day., Disp: 60 capsule, Rfl: 11    fluticasone (FLONASE) 50 MCG/ACT nasal spray, 2 sprays into the nostril(s) as directed by provider 2 (Two) Times a Day., Disp: 48 g, Rfl: 3    loratadine (CLARITIN) 10 MG tablet, Take 1 tablet by mouth As Needed., Disp: , Rfl:     sucralfate (Carafate) 1 g tablet, Take 1 tablet by mouth 4 (Four) Times a Day., Disp: 120 tablet, Rfl: 3    Allergies:  Mold extract [trichophyton]      The following portions of the patient's history were reviewed and updated as appropriate: allergies, current medications, past family history, past medical history, past social history, past surgical history, and problem list.      Review of Systems  A comprehensive 14 point review of systems was performed and is negative unless otherwise noted      Objective   /72   Ht 170.2 cm (67\")   Wt 89.4 kg (197 lb)   BMI 30.85 kg/m²     BMI followup discussion/instruction with patient: continue with current weight loss program      Physical Exam  Constitutional:       Appearance: Normal appearance. He is normal weight.      Comments: Adult male, in no acute distress. Normal " development, normal body habitus. Well nourished   HENT:      Head: Normocephalic and atraumatic.      Right Ear: External ear normal.      Left Ear: External ear normal.      Ears:      Comments: Hearing intact     Nose: Nose normal.      Comments: Nares patent, no septal deviation, no drainage     Mouth/Throat:      Comments: Airway patent, dentition intact, mucus membranes moist  Eyes:      Extraocular Movements: Extraocular movements intact.      Conjunctiva/sclera: Conjunctivae normal.      Pupils: Pupils are equal, round, and reactive to light.      Comments: External eyelids grossly normal, vision intact, no scleral icterus   Neck:      Comments: Trachea midline  Cardiovascular:      Rate and Rhythm: Normal rate.      Comments: Normotensive, no JVD bilaterally  Pulmonary:      Effort: Pulmonary effort is normal.      Breath sounds: Normal breath sounds.      Comments: Normal respiratory rate  Abdominal:      General: Abdomen is flat.      Palpations: Abdomen is soft.      Comments: Non tender. No scars, hernias or masses.   Musculoskeletal:         General: Normal range of motion.      Cervical back: Normal range of motion.   Skin:     General: Skin is warm and dry.      Comments: Skin color is consistent with ethnicity   Neurological:      General: No focal deficit present.      Mental Status: He is alert and oriented to person, place, and time.   Psychiatric:         Mood and Affect: Mood normal.         Behavior: Behavior normal.         Thought Content: Thought content normal.         Judgment: Judgment normal.      Comments: Patient understands disease process and has decision making capacity.            Labs:  I personally reviewed all pertinent labs.   Imaging: I personally reviewed all pertinent imaging studies.   Pathology:      Assessment & Plan   Diagnoses and all orders for this visit:    1. Hiatal hernia with gastroesophageal reflux disease and esophagitis (Primary)    2. Biliary dyskinesia  -      Case Request; Standing  -     CBC (No Diff); Future  -     Comprehensive Metabolic Panel; Future  -     ceFAZolin (ANCEF) 2,000 mg in sodium chloride 0.9 % 100 mL IVPB  -     acetaminophen (TYLENOL) tablet 650 mg  -     celecoxib (CeleBREX) capsule 200 mg  -     gabapentin (NEURONTIN) capsule 300 mg  -     sodium chloride 0.9 % flush 10 mL  -     sodium chloride 0.9 % flush 10 mL  -     sodium chloride 0.9 % infusion 40 mL  -     ondansetron (ZOFRAN) injection 4 mg  -     Case Request    3. Esophagitis    Other orders  -     Follow Anesthesia Guidelines / Protocol; Future  -     Follow Anesthesia Guidelines / Protocol; Standing  -     Verify NPO Status; Standing  -     Clip Operative Site; Standing  -     Have Patient Void Prior to Procedure; Standing  -     Verify / Perform Chlorhexidine Skin Prep; Standing  -     Provide NPO Instructions to Patient; Future  -     Chlorhexidine Skin Prep; Future  -     Perform Betadine Skin Prep; Future  -     Notify Provider - Standard; Standing  -     Place Sequential Compression Device; Standing  -     Maintain Sequential Compression Device; Standing  -     Insert Peripheral IV; Standing  -     Saline Lock & Maintain IV Access; Standing    24-year-old male with a hiatal hernia, GERD and esophagitis.  He also has biliary dyskinesia with an ejection fraction of 14%.  His reflux symptoms are well-controlled on his current regimen of Nexium.  He is not experiencing any dysphagia. He does have reproducible RUQ pain with eating, which he believes is associated with gallbladder disease therefore he would like to proceed with surgery.     Plan for laparoscopic cholecystectomy with intraoperative cholangiogram.  Ancef 2 g.  Outpatient surgery.    I discussed the risks, benefits and alternatives to cholecystectomy including risk of injury to surrounding structures specifically the bile duct, postoperative bile leaks, deep organ space and superficial skin infection, uncontrolled bleeding,  the need for blood transfusion, conversion to open surgery, incisional hernias, ongoing pain, bowel habit changes, long term drain and wound care, and the possibility of requiring further operations or procedures including endoscopy. Additionally, I counseled the patient on the risk of postoperative cardiopulmonary complications. I gave the patient a chance to ask any questions and answered them thoroughly. The patient understood the risks and was agreeable to proceeding to surgery. Consent was obtained from the patient.       Thank you for the referral and trusting me with the care of your patient.    Rodney Puente MD, Located within [facility]  General Surgery  2/4/2025  13:39 CST    Please note that portions of this note were completed with a voice recognition program.

## 2025-02-05 ENCOUNTER — PATIENT MESSAGE (OUTPATIENT)
Dept: FAMILY MEDICINE CLINIC | Facility: CLINIC | Age: 25
End: 2025-02-05
Payer: COMMERCIAL

## 2025-02-06 ENCOUNTER — PRE-ADMISSION TESTING (OUTPATIENT)
Dept: PREADMISSION TESTING | Facility: HOSPITAL | Age: 25
End: 2025-02-06
Payer: COMMERCIAL

## 2025-02-06 VITALS
BODY MASS INDEX: 31.21 KG/M2 | OXYGEN SATURATION: 100 % | DIASTOLIC BLOOD PRESSURE: 83 MMHG | RESPIRATION RATE: 20 BRPM | HEIGHT: 67 IN | SYSTOLIC BLOOD PRESSURE: 136 MMHG | HEART RATE: 90 BPM | WEIGHT: 198.85 LBS

## 2025-02-06 DIAGNOSIS — K82.8 BILIARY DYSKINESIA: ICD-10-CM

## 2025-02-06 LAB
ALBUMIN SERPL-MCNC: 4.5 G/DL (ref 3.5–5.2)
ALBUMIN/GLOB SERPL: 1.7 G/DL
ALP SERPL-CCNC: 65 U/L (ref 39–117)
ALT SERPL W P-5'-P-CCNC: 32 U/L (ref 1–41)
ANION GAP SERPL CALCULATED.3IONS-SCNC: 11 MMOL/L (ref 5–15)
AST SERPL-CCNC: 32 U/L (ref 1–40)
BILIRUB SERPL-MCNC: 0.4 MG/DL (ref 0–1.2)
BUN SERPL-MCNC: 11 MG/DL (ref 6–20)
BUN/CREAT SERPL: 11.2 (ref 7–25)
CALCIUM SPEC-SCNC: 10.2 MG/DL (ref 8.6–10.5)
CHLORIDE SERPL-SCNC: 103 MMOL/L (ref 98–107)
CO2 SERPL-SCNC: 28 MMOL/L (ref 22–29)
CREAT SERPL-MCNC: 0.98 MG/DL (ref 0.76–1.27)
DEPRECATED RDW RBC AUTO: 42.3 FL (ref 37–54)
EGFRCR SERPLBLD CKD-EPI 2021: 109.1 ML/MIN/1.73
ERYTHROCYTE [DISTWIDTH] IN BLOOD BY AUTOMATED COUNT: 14.3 % (ref 12.3–15.4)
GLOBULIN UR ELPH-MCNC: 2.7 GM/DL
GLUCOSE SERPL-MCNC: 86 MG/DL (ref 65–99)
HCT VFR BLD AUTO: 41 % (ref 37.5–51)
HGB BLD-MCNC: 13 G/DL (ref 13–17.7)
MCH RBC QN AUTO: 26.3 PG (ref 26.6–33)
MCHC RBC AUTO-ENTMCNC: 31.7 G/DL (ref 31.5–35.7)
MCV RBC AUTO: 82.8 FL (ref 79–97)
PLATELET # BLD AUTO: 302 10*3/MM3 (ref 140–450)
PMV BLD AUTO: 9.6 FL (ref 6–12)
POTASSIUM SERPL-SCNC: 3.6 MMOL/L (ref 3.5–5.2)
PROT SERPL-MCNC: 7.2 G/DL (ref 6–8.5)
RBC # BLD AUTO: 4.95 10*6/MM3 (ref 4.14–5.8)
SODIUM SERPL-SCNC: 142 MMOL/L (ref 136–145)
WBC NRBC COR # BLD AUTO: 6.88 10*3/MM3 (ref 3.4–10.8)

## 2025-02-06 PROCEDURE — 85027 COMPLETE CBC AUTOMATED: CPT

## 2025-02-06 PROCEDURE — 36415 COLL VENOUS BLD VENIPUNCTURE: CPT

## 2025-02-06 PROCEDURE — 80053 COMPREHEN METABOLIC PANEL: CPT

## 2025-02-06 NOTE — DISCHARGE INSTRUCTIONS
Preparing for Surgery  Follow these instructions before the procedure:  Several days or weeks before your procedure  Medication(s) you need to stop   _______ days/week prior to surgery: ***      Ask your health care provider about:  Changing or stopping your regular medicines. This is especially important if you are taking diabetes medicines or blood thinners.  Taking medicines such as aspirin and ibuprofen. These medicines can thin your blood. Do not take these medicines unless your health care provider tells you to take them.  Taking over-the-counter medicines, vitamins, herbs, and supplements.    Contact your surgeon if you:  Develop a fever of more than 100.4°F (38°C) or other feelings of illness during the 48 hours before your surgery.  Have symptoms that get worse.  Have questions or concerns about your surgery.  If you are going home the same day of your surgery you will need to arrange for a responsible adult, age 18 years old or older, to drive you home from the hospital and stay with you for 24 hours. Verification of the  will be made prior to any procedure requiring sedation. You may not go home in a taxi or any form of public transportation by yourself.     Day before your procedure  Medication(s) you need to stop the day before your surgery:***    24 hours before your procedure DO NOT drink alcoholic beverages or smoke.  24 hours before your procedure STOP taking Erectile Dysfunction medication (i.e.,Cialis, Viagra)   You may be asked to shower with a germ-killing soap.  Day of your procedure   You may take the following medication(s) the morning of surgery with a sip of water: nexium      8 hours before your scheduled arrival time, STOP all food, any dairy products, and full liquids. This includes hard candy, chewing gum or mints. This is extremely important to prevent serious complications.     Up to 2 hours before your scheduled arrival time, you may have clear liquids no cream, powder, or pulp  of any kind. Safe options are water, black coffee, plain tea, soda, Gatorade/Powerade, clear broth, apple juice.    2 hours before your scheduled arrival time, STOP drinking clear liquids.    You may need to take another shower with a germ-killing soap before you leave home in the morning. Do not use perfumes, colognes, or body lotions.  Wear comfortable loose-fitting clothing.  Remove all jewelry including body piercing and rings, dark colored nail polish, and make up prior to arrival at the hospital. Leave all valuables at home.   Bring your hearing aids if you rely on them.  Do not wear contact lenses. If you wear eyeglasses remember to bring a case to store them in while you are in surgery.  Do not use denture adhesives since you will be asked to remove them during your surgery.    You do not need to bring your home medications into the hospital.   Bring your sleep apnea device with you on the day of your surgery (if this applies to you).  If you have an Inspire implant for sleep apnea, please bring the remote with you on the day of surgery.  If you wear portable oxygen, bring it with you.   If you are staying overnight, you may bring a bag of items you may need such as slippers, robe and a change of clothes for your discharge. You may want to leave these items in the car until you are ready for them since your family will take your belongings when you leave the pre-operative area.  Arrive at the hospital as scheduled by the office. You will be asked to arrive 2 hours prior to your surgery time in order to prepare for your procedure.  When you arrive at the hospital  Go to the registration desk located at the main entrance of the hospital.  After registration is completed, you will be given a beeper and a sticker sheet. Take the stickers to Outpatient Surgery and place in the tray at the end of the desk to notify the staff that you have arrived and registered.   Return to the lobby to wait. You are not always  called back according to the time of arrival but rather the time your doctor will be ready.  When your beeper lights up and vibrates proceed through the double doors, under the stairs, and a member of the Outpatient Surgery staff will escort you to your preoperative room.   How to Use Chlorhexidine Before Surgery  Chlorhexidine gluconate (CHG) is a germ-killing (antiseptic) solution that is used to clean the skin. It can get rid of the bacteria that normally live on the skin and can keep them away for about 24 hours. To clean your skin with CHG, you may be given:  A CHG solution to use in the shower or as part of a sponge bath.  A prepackaged cloth that contains CHG.  Cleaning your skin with CHG may help lower the risk for infection:  While you are staying in the intensive care unit of the hospital.  If you have a vascular access, such as a central line, to provide short-term or long-term access to your veins.  If you have a catheter to drain urine from your bladder.  If you are on a ventilator. A ventilator is a machine that helps you breathe by moving air in and out of your lungs.  After surgery.  What are the risks?  Risks of using CHG include:  A skin reaction.  Hearing loss, if CHG gets in your ears and you have a perforated eardrum.  Eye injury, if CHG gets in your eyes and is not rinsed out.  The CHG product catching fire.  Make sure that you avoid smoking and flames after applying CHG to your skin.  Do not use CHG:  If you have a chlorhexidine allergy or have previously reacted to chlorhexidine.  On babies younger than 2 months of age.  How to use CHG solution  Use CHG only as told by your health care provider, and follow the instructions on the label.  Use the full amount of CHG as directed. Usually, this is one bottle.  During a shower    Follow these steps when using CHG solution during a shower (unless your health care provider gives you different instructions):  Start the shower.  Use your normal soap  and shampoo to wash your face and hair.  Turn off the shower or move out of the shower stream.  Pour the CHG onto a clean washcloth. Do not use any type of brush or rough-edged sponge.  Starting at your neck, lather your body down to your toes. Make sure you follow these instructions:  If you will be having surgery, pay special attention to the part of your body where you will be having surgery. Scrub this area for at least 1 minute.  Do not use CHG on your head or face. If the solution gets into your ears or eyes, rinse them well with water.  Avoid your genital area.  Avoid any areas of skin that have broken skin, cuts, or scrapes.  Scrub your back and under your arms. Make sure to wash skin folds.  Let the lather sit on your skin for 1-2 minutes or as long as told by your health care provider.  Thoroughly rinse your entire body in the shower. Make sure that all body creases and crevices are rinsed well.  Dry off with a clean towel. Do not put any substances on your body afterward--such as powder, lotion, or perfume--unless you are told to do so by your health care provider. Only use lotions that are recommended by the .  Put on clean clothes or pajamas.  If it is the night before your surgery, sleep in clean sheets.     During a sponge bath  Follow these steps when using CHG solution during a sponge bath (unless your health care provider gives you different instructions):  Use your normal soap and shampoo to wash your face and hair.  Pour the CHG onto a clean washcloth.  Starting at your neck, lather your body down to your toes. Make sure you follow these instructions:  If you will be having surgery, pay special attention to the part of your body where you will be having surgery. Scrub this area for at least 1 minute.  Do not use CHG on your head or face. If the solution gets into your ears or eyes, rinse them well with water.  Avoid your genital area.  Avoid any areas of skin that have broken skin,  cuts, or scrapes.  Scrub your back and under your arms. Make sure to wash skin folds.  Let the lather sit on your skin for 1-2 minutes or as long as told by your health care provider.  Using a different clean, wet washcloth, thoroughly rinse your entire body. Make sure that all body creases and crevices are rinsed well.  Dry off with a clean towel. Do not put any substances on your body afterward--such as powder, lotion, or perfume--unless you are told to do so by your health care provider. Only use lotions that are recommended by the .  Put on clean clothes or pajamas.  If it is the night before your surgery, sleep in clean sheets.  How to use CHG prepackaged cloths  Only use CHG cloths as told by your health care provider, and follow the instructions on the label.  Use the CHG cloth on clean, dry skin.  Do not use the CHG cloth on your head or face unless your health care provider tells you to.  When washing with the CHG cloth:  Avoid your genital area.  Avoid any areas of skin that have broken skin, cuts, or scrapes.  Before surgery    Follow these steps when using a CHG cloth to clean before surgery (unless your health care provider gives you different instructions):  Using the CHG cloth, vigorously scrub the part of your body where you will be having surgery. Scrub using a back-and-forth motion for 3 minutes. The area on your body should be completely wet with CHG when you are done scrubbing.  Do not rinse. Discard the cloth and let the area air-dry. Do not put any substances on the area afterward, such as powder, lotion, or perfume.  Put on clean clothes or pajamas.  If it is the night before your surgery, sleep in clean sheets.     For general bathing  Follow these steps when using CHG cloths for general bathing (unless your health care provider gives you different instructions).  Use a separate CHG cloth for each area of your body. Make sure you wash between any folds of skin and between your  fingers and toes. Wash your body in the following order, switching to a new cloth after each step:  The front of your neck, shoulders, and chest.  Both of your arms, under your arms, and your hands.  Your stomach and groin area, avoiding the genitals.  Your right leg and foot.  Your left leg and foot.  The back of your neck, your back, and your buttocks.  Do not rinse. Discard the cloth and let the area air-dry. Do not put any substances on your body afterward--such as powder, lotion, or perfume--unless you are told to do so by your health care provider. Only use lotions that are recommended by the .  Put on clean clothes or pajamas.  Contact a health care provider if:  Your skin gets irritated after scrubbing.  You have questions about using your solution or cloth.  You swallow any chlorhexidine. Call your local poison control center (1-804.892.8989 in the [place]  Get help right away if:  Your eyes itch badly, or they become very red or swollen.  Your skin itches badly and is red or swollen.  Your hearing changes.  You have trouble seeing.  You have swelling or tingling in your mouth or throat.  You have trouble breathing.  These symptoms may represent a serious problem that is an emergency. Do not wait to see if the symptoms will go away. Get medical help right away. Call your local emergency services (697 in the [place] Do not drive yourself to the hospital.  Summary  Chlorhexidine gluconate (CHG) is a germ-killing (antiseptic) solution that is used to clean the skin. Cleaning your skin with CHG may help to lower your risk for infection.  You may be given CHG to use for bathing. It may be in a bottle or in a prepackaged cloth to use on your skin. Carefully follow your health care provider's instructions and the instructions on the product label.  Do not use CHG if you have a chlorhexidine allergy.  Contact your health care provider if your skin gets irritated after scrubbing.  This information is not  intended to replace advice given to you by your health care provider. Make sure you discuss any questions you have with your health care provider.  Document Revised: 04/17/2023 Document Reviewed: 02/28/2022  Elsevier Patient Education © 2023 [employer]

## 2025-02-07 ENCOUNTER — TELEPHONE (OUTPATIENT)
Dept: FAMILY MEDICINE CLINIC | Facility: CLINIC | Age: 25
End: 2025-02-07
Payer: COMMERCIAL

## 2025-02-07 NOTE — TELEPHONE ENCOUNTER
Patient called to see if his short term disability paperwork can be picked up today. His surgery is on Tuesday. He needs this for his job so he doesn't get fired.

## 2025-02-11 ENCOUNTER — HOSPITAL ENCOUNTER (OUTPATIENT)
Facility: HOSPITAL | Age: 25
Setting detail: HOSPITAL OUTPATIENT SURGERY
Discharge: HOME | End: 2025-02-11
Attending: SURGERY | Admitting: SURGERY
Payer: COMMERCIAL

## 2025-02-11 ENCOUNTER — ANESTHESIA EVENT (OUTPATIENT)
Dept: PERIOP | Facility: HOSPITAL | Age: 25
End: 2025-02-11
Payer: COMMERCIAL

## 2025-02-11 ENCOUNTER — ANESTHESIA (OUTPATIENT)
Dept: PERIOP | Facility: HOSPITAL | Age: 25
End: 2025-02-11
Payer: COMMERCIAL

## 2025-02-11 ENCOUNTER — APPOINTMENT (OUTPATIENT)
Dept: GENERAL RADIOLOGY | Facility: HOSPITAL | Age: 25
End: 2025-02-11
Payer: COMMERCIAL

## 2025-02-11 VITALS
TEMPERATURE: 97.7 F | DIASTOLIC BLOOD PRESSURE: 90 MMHG | SYSTOLIC BLOOD PRESSURE: 146 MMHG | HEART RATE: 52 BPM | OXYGEN SATURATION: 98 % | RESPIRATION RATE: 16 BRPM

## 2025-02-11 DIAGNOSIS — K82.8 BILIARY DYSKINESIA: ICD-10-CM

## 2025-02-11 PROCEDURE — 25510000001 IOPAMIDOL 61 % SOLUTION: Performed by: SURGERY

## 2025-02-11 PROCEDURE — 25010000002 FENTANYL CITRATE (PF) 100 MCG/2ML SOLUTION

## 2025-02-11 PROCEDURE — 47563 LAPARO CHOLECYSTECTOMY/GRAPH: CPT | Performed by: SURGERY

## 2025-02-11 PROCEDURE — 25010000002 DEXAMETHASONE PER 1 MG

## 2025-02-11 PROCEDURE — 88304 TISSUE EXAM BY PATHOLOGIST: CPT | Performed by: SURGERY

## 2025-02-11 PROCEDURE — 25010000002 CEFAZOLIN PER 500 MG: Performed by: SURGERY

## 2025-02-11 PROCEDURE — 25010000002 SUGAMMADEX 200 MG/2ML SOLUTION

## 2025-02-11 PROCEDURE — 25010000002 DEXAMETHASONE PER 1 MG: Performed by: ANESTHESIOLOGY

## 2025-02-11 PROCEDURE — 25010000002 HYDROMORPHONE 1 MG/ML SOLUTION

## 2025-02-11 PROCEDURE — 25010000002 ONDANSETRON PER 1 MG

## 2025-02-11 PROCEDURE — 25810000003 LACTATED RINGERS PER 1000 ML: Performed by: SURGERY

## 2025-02-11 PROCEDURE — 25010000002 ROPIVACAINE PER 1 MG: Performed by: SURGERY

## 2025-02-11 PROCEDURE — C1894 INTRO/SHEATH, NON-LASER: HCPCS | Performed by: SURGERY

## 2025-02-11 PROCEDURE — 25010000002 LIDOCAINE PF 2% 2 % SOLUTION

## 2025-02-11 PROCEDURE — 25010000002 MIDAZOLAM PER 1MG: Performed by: ANESTHESIOLOGY

## 2025-02-11 PROCEDURE — 25010000002 ONDANSETRON PER 1 MG: Performed by: ANESTHESIOLOGY

## 2025-02-11 PROCEDURE — 25010000002 FENTANYL CITRATE (PF) 50 MCG/ML SOLUTION: Performed by: ANESTHESIOLOGY

## 2025-02-11 PROCEDURE — 25010000002 PROPOFOL 10 MG/ML EMULSION

## 2025-02-11 DEVICE — LIGACLIP 10-M/L, 10MM ENDOSCOPIC ROTATING MULTIPLE CLIP APPLIERS
Type: IMPLANTABLE DEVICE | Site: ABDOMEN | Status: FUNCTIONAL
Brand: LIGACLIP

## 2025-02-11 RX ORDER — SODIUM CHLORIDE 0.9 % (FLUSH) 0.9 %
3 SYRINGE (ML) INJECTION AS NEEDED
Status: DISCONTINUED | OUTPATIENT
Start: 2025-02-11 | End: 2025-02-11 | Stop reason: HOSPADM

## 2025-02-11 RX ORDER — SODIUM CHLORIDE 0.9 % (FLUSH) 0.9 %
10 SYRINGE (ML) INJECTION AS NEEDED
Status: DISCONTINUED | OUTPATIENT
Start: 2025-02-11 | End: 2025-02-11 | Stop reason: HOSPADM

## 2025-02-11 RX ORDER — SODIUM CHLORIDE 9 MG/ML
40 INJECTION, SOLUTION INTRAVENOUS AS NEEDED
Status: DISCONTINUED | OUTPATIENT
Start: 2025-02-11 | End: 2025-02-11 | Stop reason: HOSPADM

## 2025-02-11 RX ORDER — DEXAMETHASONE SODIUM PHOSPHATE 4 MG/ML
4 INJECTION, SOLUTION INTRA-ARTICULAR; INTRALESIONAL; INTRAMUSCULAR; INTRAVENOUS; SOFT TISSUE ONCE AS NEEDED
Status: COMPLETED | OUTPATIENT
Start: 2025-02-11 | End: 2025-02-11

## 2025-02-11 RX ORDER — ONDANSETRON 4 MG/1
4 TABLET, FILM COATED ORAL EVERY 8 HOURS PRN
Qty: 15 TABLET | Refills: 0 | Status: SHIPPED | OUTPATIENT
Start: 2025-02-11 | End: 2026-02-11

## 2025-02-11 RX ORDER — LIDOCAINE HYDROCHLORIDE 20 MG/ML
INJECTION, SOLUTION EPIDURAL; INFILTRATION; INTRACAUDAL; PERINEURAL AS NEEDED
Status: DISCONTINUED | OUTPATIENT
Start: 2025-02-11 | End: 2025-02-11 | Stop reason: SURG

## 2025-02-11 RX ORDER — LABETALOL HYDROCHLORIDE 5 MG/ML
5 INJECTION, SOLUTION INTRAVENOUS
Status: DISCONTINUED | OUTPATIENT
Start: 2025-02-11 | End: 2025-02-11 | Stop reason: HOSPADM

## 2025-02-11 RX ORDER — OXYCODONE HYDROCHLORIDE 5 MG/1
5 TABLET ORAL EVERY 4 HOURS PRN
Qty: 12 TABLET | Refills: 0 | Status: SHIPPED | OUTPATIENT
Start: 2025-02-11 | End: 2025-02-14

## 2025-02-11 RX ORDER — FENTANYL CITRATE 50 UG/ML
50 INJECTION, SOLUTION INTRAMUSCULAR; INTRAVENOUS
Status: DISCONTINUED | OUTPATIENT
Start: 2025-02-11 | End: 2025-02-11 | Stop reason: HOSPADM

## 2025-02-11 RX ORDER — CYCLOBENZAPRINE HCL 5 MG
5 TABLET ORAL EVERY 8 HOURS PRN
Qty: 30 TABLET | Refills: 0 | Status: SHIPPED | OUTPATIENT
Start: 2025-02-11 | End: 2025-02-21

## 2025-02-11 RX ORDER — HYDROCODONE BITARTRATE AND ACETAMINOPHEN 5; 325 MG/1; MG/1
1 TABLET ORAL EVERY 4 HOURS PRN
Status: DISCONTINUED | OUTPATIENT
Start: 2025-02-11 | End: 2025-02-11 | Stop reason: HOSPADM

## 2025-02-11 RX ORDER — LIDOCAINE HYDROCHLORIDE 10 MG/ML
0.5 INJECTION, SOLUTION EPIDURAL; INFILTRATION; INTRACAUDAL; PERINEURAL ONCE AS NEEDED
Status: DISCONTINUED | OUTPATIENT
Start: 2025-02-11 | End: 2025-02-11 | Stop reason: HOSPADM

## 2025-02-11 RX ORDER — PROPOFOL 10 MG/ML
VIAL (ML) INTRAVENOUS AS NEEDED
Status: DISCONTINUED | OUTPATIENT
Start: 2025-02-11 | End: 2025-02-11 | Stop reason: SURG

## 2025-02-11 RX ORDER — SCOPOLAMINE 1 MG/3D
1 PATCH, EXTENDED RELEASE TRANSDERMAL ONCE
Status: DISCONTINUED | OUTPATIENT
Start: 2025-02-11 | End: 2025-02-11 | Stop reason: HOSPADM

## 2025-02-11 RX ORDER — NALOXONE HCL 0.4 MG/ML
0.4 VIAL (ML) INJECTION AS NEEDED
Status: DISCONTINUED | OUTPATIENT
Start: 2025-02-11 | End: 2025-02-11 | Stop reason: HOSPADM

## 2025-02-11 RX ORDER — SODIUM CHLORIDE, SODIUM LACTATE, POTASSIUM CHLORIDE, CALCIUM CHLORIDE 600; 310; 30; 20 MG/100ML; MG/100ML; MG/100ML; MG/100ML
100 INJECTION, SOLUTION INTRAVENOUS CONTINUOUS
Status: DISCONTINUED | OUTPATIENT
Start: 2025-02-11 | End: 2025-02-11 | Stop reason: HOSPADM

## 2025-02-11 RX ORDER — ROCURONIUM BROMIDE 10 MG/ML
INJECTION, SOLUTION INTRAVENOUS AS NEEDED
Status: DISCONTINUED | OUTPATIENT
Start: 2025-02-11 | End: 2025-02-11 | Stop reason: SURG

## 2025-02-11 RX ORDER — FLUMAZENIL 0.1 MG/ML
0.2 INJECTION INTRAVENOUS AS NEEDED
Status: DISCONTINUED | OUTPATIENT
Start: 2025-02-11 | End: 2025-02-11 | Stop reason: HOSPADM

## 2025-02-11 RX ORDER — IBUPROFEN 600 MG/1
600 TABLET, FILM COATED ORAL EVERY 6 HOURS PRN
Status: DISCONTINUED | OUTPATIENT
Start: 2025-02-11 | End: 2025-02-11 | Stop reason: HOSPADM

## 2025-02-11 RX ORDER — ACETAMINOPHEN 325 MG/1
650 TABLET ORAL ONCE
Status: COMPLETED | OUTPATIENT
Start: 2025-02-11 | End: 2025-02-11

## 2025-02-11 RX ORDER — MAGNESIUM HYDROXIDE 1200 MG/15ML
LIQUID ORAL AS NEEDED
Status: DISCONTINUED | OUTPATIENT
Start: 2025-02-11 | End: 2025-02-11 | Stop reason: HOSPADM

## 2025-02-11 RX ORDER — ONDANSETRON 2 MG/ML
INJECTION INTRAMUSCULAR; INTRAVENOUS AS NEEDED
Status: DISCONTINUED | OUTPATIENT
Start: 2025-02-11 | End: 2025-02-11 | Stop reason: SURG

## 2025-02-11 RX ORDER — HYDROCODONE BITARTRATE AND ACETAMINOPHEN 10; 325 MG/1; MG/1
1 TABLET ORAL EVERY 4 HOURS PRN
Status: DISCONTINUED | OUTPATIENT
Start: 2025-02-11 | End: 2025-02-11 | Stop reason: HOSPADM

## 2025-02-11 RX ORDER — GABAPENTIN 300 MG/1
300 CAPSULE ORAL 3 TIMES DAILY
Status: DISCONTINUED | OUTPATIENT
Start: 2025-02-11 | End: 2025-02-11 | Stop reason: HOSPADM

## 2025-02-11 RX ORDER — SODIUM CHLORIDE 0.9 % (FLUSH) 0.9 %
10 SYRINGE (ML) INJECTION EVERY 12 HOURS SCHEDULED
Status: DISCONTINUED | OUTPATIENT
Start: 2025-02-11 | End: 2025-02-11 | Stop reason: HOSPADM

## 2025-02-11 RX ORDER — IOPAMIDOL 612 MG/ML
INJECTION, SOLUTION INTRAVASCULAR AS NEEDED
Status: DISCONTINUED | OUTPATIENT
Start: 2025-02-11 | End: 2025-02-11 | Stop reason: HOSPADM

## 2025-02-11 RX ORDER — DEXAMETHASONE SODIUM PHOSPHATE 4 MG/ML
INJECTION, SOLUTION INTRA-ARTICULAR; INTRALESIONAL; INTRAMUSCULAR; INTRAVENOUS; SOFT TISSUE AS NEEDED
Status: DISCONTINUED | OUTPATIENT
Start: 2025-02-11 | End: 2025-02-11 | Stop reason: SURG

## 2025-02-11 RX ORDER — SODIUM CHLORIDE 0.9 % (FLUSH) 0.9 %
3-10 SYRINGE (ML) INJECTION AS NEEDED
Status: DISCONTINUED | OUTPATIENT
Start: 2025-02-11 | End: 2025-02-11 | Stop reason: HOSPADM

## 2025-02-11 RX ORDER — MIDAZOLAM HYDROCHLORIDE 2 MG/2ML
2 INJECTION, SOLUTION INTRAMUSCULAR; INTRAVENOUS
Status: DISCONTINUED | OUTPATIENT
Start: 2025-02-11 | End: 2025-02-11 | Stop reason: HOSPADM

## 2025-02-11 RX ORDER — ONDANSETRON 2 MG/ML
4 INJECTION INTRAMUSCULAR; INTRAVENOUS ONCE AS NEEDED
Status: COMPLETED | OUTPATIENT
Start: 2025-02-11 | End: 2025-02-11

## 2025-02-11 RX ORDER — ONDANSETRON 2 MG/ML
4 INJECTION INTRAMUSCULAR; INTRAVENOUS ONCE AS NEEDED
Status: DISCONTINUED | OUTPATIENT
Start: 2025-02-11 | End: 2025-02-11 | Stop reason: HOSPADM

## 2025-02-11 RX ORDER — SODIUM CHLORIDE, SODIUM LACTATE, POTASSIUM CHLORIDE, CALCIUM CHLORIDE 600; 310; 30; 20 MG/100ML; MG/100ML; MG/100ML; MG/100ML
1000 INJECTION, SOLUTION INTRAVENOUS CONTINUOUS
Status: DISCONTINUED | OUTPATIENT
Start: 2025-02-11 | End: 2025-02-11 | Stop reason: HOSPADM

## 2025-02-11 RX ORDER — LIDOCAINE HYDROCHLORIDE 40 MG/ML
SOLUTION TOPICAL AS NEEDED
Status: DISCONTINUED | OUTPATIENT
Start: 2025-02-11 | End: 2025-02-11 | Stop reason: SURG

## 2025-02-11 RX ORDER — SUCCINYLCHOLINE/SOD CL,ISO/PF 200MG/10ML
SYRINGE (ML) INTRAVENOUS AS NEEDED
Status: DISCONTINUED | OUTPATIENT
Start: 2025-02-11 | End: 2025-02-11 | Stop reason: SURG

## 2025-02-11 RX ORDER — FENTANYL CITRATE 50 UG/ML
INJECTION, SOLUTION INTRAMUSCULAR; INTRAVENOUS AS NEEDED
Status: DISCONTINUED | OUTPATIENT
Start: 2025-02-11 | End: 2025-02-11 | Stop reason: SURG

## 2025-02-11 RX ORDER — SODIUM CHLORIDE 0.9 % (FLUSH) 0.9 %
3 SYRINGE (ML) INJECTION EVERY 12 HOURS SCHEDULED
Status: DISCONTINUED | OUTPATIENT
Start: 2025-02-11 | End: 2025-02-11 | Stop reason: HOSPADM

## 2025-02-11 RX ORDER — CELECOXIB 200 MG/1
200 CAPSULE ORAL ONCE
Status: COMPLETED | OUTPATIENT
Start: 2025-02-11 | End: 2025-02-11

## 2025-02-11 RX ADMIN — ACETAMINOPHEN 650 MG: 325 TABLET ORAL at 06:16

## 2025-02-11 RX ADMIN — CELECOXIB 200 MG: 200 CAPSULE ORAL at 06:16

## 2025-02-11 RX ADMIN — ROCURONIUM BROMIDE 40 MG: 10 INJECTION INTRAVENOUS at 08:05

## 2025-02-11 RX ADMIN — SUGAMMADEX 200 MG: 100 INJECTION, SOLUTION INTRAVENOUS at 08:29

## 2025-02-11 RX ADMIN — MIDAZOLAM HYDROCHLORIDE 2 MG: 1 INJECTION, SOLUTION INTRAMUSCULAR; INTRAVENOUS at 07:05

## 2025-02-11 RX ADMIN — SCOPOLAMINE 1 PATCH: 1.5 PATCH, EXTENDED RELEASE TRANSDERMAL at 07:05

## 2025-02-11 RX ADMIN — PROPOFOL 200 MG: 10 INJECTION, EMULSION INTRAVENOUS at 07:59

## 2025-02-11 RX ADMIN — FENTANYL CITRATE 100 MCG: 50 INJECTION, SOLUTION INTRAMUSCULAR; INTRAVENOUS at 07:59

## 2025-02-11 RX ADMIN — ONDANSETRON 4 MG: 2 INJECTION INTRAMUSCULAR; INTRAVENOUS at 07:05

## 2025-02-11 RX ADMIN — DEXAMETHASONE SODIUM PHOSPHATE 4 MG: 4 INJECTION, SOLUTION INTRA-ARTICULAR; INTRALESIONAL; INTRAMUSCULAR; INTRAVENOUS; SOFT TISSUE at 08:07

## 2025-02-11 RX ADMIN — LIDOCAINE HYDROCHLORIDE 1 EACH: 40 SOLUTION TOPICAL at 07:59

## 2025-02-11 RX ADMIN — SODIUM CHLORIDE, POTASSIUM CHLORIDE, SODIUM LACTATE AND CALCIUM CHLORIDE 1000 ML: 600; 310; 30; 20 INJECTION, SOLUTION INTRAVENOUS at 06:17

## 2025-02-11 RX ADMIN — FENTANYL CITRATE 50 MCG: 50 INJECTION, SOLUTION INTRAMUSCULAR; INTRAVENOUS at 09:27

## 2025-02-11 RX ADMIN — LIDOCAINE HYDROCHLORIDE 100 MG: 20 INJECTION, SOLUTION EPIDURAL; INFILTRATION; INTRACAUDAL; PERINEURAL at 07:59

## 2025-02-11 RX ADMIN — DEXAMETHASONE SODIUM PHOSPHATE 4 MG: 4 INJECTION, SOLUTION INTRA-ARTICULAR; INTRALESIONAL; INTRAMUSCULAR; INTRAVENOUS; SOFT TISSUE at 07:05

## 2025-02-11 RX ADMIN — GABAPENTIN 300 MG: 300 CAPSULE ORAL at 06:30

## 2025-02-11 RX ADMIN — Medication 160 MG: at 07:59

## 2025-02-11 RX ADMIN — HYDROCODONE BITARTRATE AND ACETAMINOPHEN 1 TABLET: 10; 325 TABLET ORAL at 09:25

## 2025-02-11 RX ADMIN — HYDROMORPHONE HYDROCHLORIDE 1 MG: 1 INJECTION, SOLUTION INTRAMUSCULAR; INTRAVENOUS; SUBCUTANEOUS at 08:35

## 2025-02-11 RX ADMIN — ONDANSETRON 4 MG: 2 INJECTION INTRAMUSCULAR; INTRAVENOUS at 08:22

## 2025-02-11 RX ADMIN — CEFAZOLIN 2000 MG: 2 INJECTION, POWDER, FOR SOLUTION INTRAMUSCULAR; INTRAVENOUS at 08:05

## 2025-02-11 NOTE — ANESTHESIA PROCEDURE NOTES
Airway  Urgency: elective    Date/Time: 2/11/2025 7:59 AM  Airway not difficult    General Information and Staff    Patient location during procedure: OR  CRNA/CAA: Emigdio Guzman CRNA    Indications and Patient Condition  Indications for airway management: airway protection    Preoxygenated: yes  Mask difficulty assessment: 0 - not attempted    Final Airway Details  Final airway type: endotracheal airway      Successful airway: ETT  Cuffed: yes   Successful intubation technique: video laryngoscopy and RSI  Facilitating devices/methods: cricoid pressure  Endotracheal tube insertion site: oral  Blade: Horowitz  Blade size: 4  ETT size (mm): 7.5  Cormack-Lehane Classification: grade I - full view of glottis  Placement verified by: capnometry   Cuff volume (mL): 10  Measured from: lips  ETT/EBT  to lips (cm): 22  Number of attempts at approach: 1  Assessment: lips, teeth, and gum same as pre-op and atraumatic intubation

## 2025-02-11 NOTE — OP NOTE
PREOPERATIVE DIAGNOSIS: Biliary dyskinesia        POSTOPERATIVE DIAGNOSIS: Same         PROCEDURE PERFORMED: Laparoscopic cholecystectomy        SURGEON: Rodney Puente MD        ASSISTANT:  Neda EASTMAN,        SPECIMENS: Gallbladder for permanent        ANESTHESIA: General tracheal anesthesia with bilateral tap block        BLOOD LOSS: 1  mL    FLUIDS: 500 mL        WOUND CLASSIFICATION: Class 3, contaminated        FINDINGS:   Minimally inflamed and distended gallbladder without stones.  Critical view of safety obtained with a solitary cystic duct and a solitary cystic artery and a clear space posteriorly one third of the way up the gallbladder fossa.  Normal biliary anatomy.  Excellent hemostasis.         INDICATIONS: 24 y.o. male with hiatal hernia, GERD and biliary dyskinesia with intractable nausea and vomiting        DESCRIPTION OF PROCEDURE:      Informed consent was obtained. The patient was taken to the operating room and placed on the operating table in the supine position. Bilateral SCDs were placed. General anesthesia was administered and the patient was intubated without difficulty. Ancef 2 gm was administered for preoperative antibiotics. The abdomen was prepped and draped in the usual fashion.  A full surgical timeout was performed verifying the correct patient, correct procedure and correct site for surgery.     Local anesthesia was infiltrated into the left upper quadrant at Guzman's point.  A small incision was made and a Veress needle was inserted with 2 satisfactory clicks.  An excellent saline drop test confirmed correct intra-abdominal placement.  Pneumoperitoneum was initiated to 15 mmHg.  Patient was positioned in reverse Trendelenburg.  Local anesthesia was injected in the inferior aspect of the umbilicus.  A small incision was made and a 5 mm Optiview trocar was inserted under direct visualization. The abdomen was examined.  Safe entry was confirmed after a satisfactory exam showed no evidence  of injury to intra-abdominal structures.The Veress needle stick site was examined and appeared to be hemostatic. A bilateral TAP block using 10 mL of 1% ropivacaine and 100 mcg of Precedex was performed with local anesthesia injected into the usual trocar sites. Incisions were made and trocars were inserted under direct visualization.     The gallbladder was minimally inflamed and distended.  There were no stones. The gallbladder was retracted cephalad and the peritoneum was incised using hook cautery.  Careful dissection was undertaken in the cystohepatic triangle. The dissection was straightforward. There was normal biliary anatomy.  A critical view of safety was obtained of a solitary cystic duct and a solitary cystic artery was obtained with a clear space posteriorly at least one third of the distance up the cystic plate.  Several clips were placed on the cystic duct and cystic artery.  These were divided using electrocautery.  The remainder of the gallbladder was dissected off of the gallbladder fossa.  Using an Endo Catch bag, the gallbladder was extracted through the epigastric port site.  The fascia of the extraction site was closed with a 0 Vicryl suture in a figure-of-eight fashion using a transfascial suture passer. There was meticulous hemostasis.  The trocars were removed under direct visualization and pneumoperitoneum was released.  The incisions were irrigated using saline and closed with 4-0 Monocryl suture.  The incisions were dressed with Mastisol and Steri-Strips.     At the conclusion of the case all needle, sharp and sponge counts were correct times two at the completion of the procedure. The patient awoken from anesthesia, extubated in the operating room and was transported to the PACU in stable condition without any immediate complications.

## 2025-02-11 NOTE — ANESTHESIA POSTPROCEDURE EVALUATION
Patient: Ezekiel Palmer    Procedure Summary       Date: 02/11/25 Room / Location:  [facility] OR 04 /  [facility]    Anesthesia Start: 0755 Anesthesia Stop: 0853    Procedure: LAPAROSCOPIC CHOLECYSTECTOMY (Abdomen) Diagnosis:       Biliary dyskinesia      (Biliary dyskinesia [K82.8])    Surgeons: Rodney Puente MD Provider: Emigdio Guzman CRNA    Anesthesia Type: general ASA Status: 2            Anesthesia Type: general    Vitals  Vitals Value Taken Time   /88 02/11/25 1005   Temp 98 °F (36.7 °C) 02/11/25 0849   Pulse 50 02/11/25 1013   Resp 16 02/11/25 1004   SpO2 99 % 02/11/25 1013   Vitals shown include unfiled device data.        Post Anesthesia Care and Evaluation    Patient location during evaluation: PHASE II  Patient participation: complete - patient participated  Level of consciousness: awake and awake and alert  Pain score: 0  Pain management: adequate    Airway patency: patent  Anesthetic complications: No anesthetic complications  PONV Status: none  Cardiovascular status: acceptable  Respiratory status: acceptable  Hydration status: acceptable    Comments: Patient discharged according to acceptable Kenneth score per RN assessment. See nursing records for further information.     Blood pressure 119/88, pulse (!) 48, temperature 98 °F (36.7 °C), temperature source Temporal, resp. rate 16, SpO2 97%.

## 2025-02-11 NOTE — ANESTHESIA PREPROCEDURE EVALUATION
Anesthesia Evaluation     Patient summary reviewed   no history of anesthetic complications:   NPO Solid Status: > 8 hours  NPO Liquid Status: > 8 hours           Airway   Mallampati: I  TM distance: >3 FB  Neck ROM: full  No difficulty expected  Dental - normal exam     Pulmonary    (+) a smoker Former, vape and cigarettes,  (-) asthma, sleep apnea  Cardiovascular   Exercise tolerance: excellent (>7 METS)    (-) hypertension, hyperlipidemia      Neuro/Psych  (-) seizures, TIA, CVA  GI/Hepatic/Renal/Endo    (+) obesity, GERD  (-) liver disease, no renal disease, diabetes    Musculoskeletal     Abdominal    Substance History      OB/GYN          Other                    Anesthesia Plan    ASA 2     general   Rapid sequence  (Vomiting x24 hours patient, will plan to give IV zofran in preop and ppx for additional PONV)    Anesthetic plan, risks, benefits, and alternatives have been provided, discussed and informed consent has been obtained with: patient.    CODE STATUS:

## 2025-02-12 ENCOUNTER — TELEPHONE (OUTPATIENT)
Dept: SURGERY | Facility: CLINIC | Age: 25
End: 2025-02-12
Payer: COMMERCIAL

## 2025-02-12 NOTE — TELEPHONE ENCOUNTER
Post OP phone call visit:    Type of surgery: Laparoscopic cholecystectomy.  How are you feeling? Doing really well.   Are you having any pain or Nausea? Sore. Some nausea.   Do you have a normal appetite? Yes.   Are you passing gas or having any BM? Passing gas. Taking stool softeners.   How is your activity? Okay.   Any drainage or fever? No redness. No drainage. No fever.

## 2025-02-18 ENCOUNTER — OFFICE VISIT (OUTPATIENT)
Dept: FAMILY MEDICINE CLINIC | Facility: CLINIC | Age: 25
End: 2025-02-18
Payer: COMMERCIAL

## 2025-02-18 VITALS
HEIGHT: 67 IN | OXYGEN SATURATION: 97 % | HEART RATE: 72 BPM | TEMPERATURE: 98.4 F | WEIGHT: 186 LBS | RESPIRATION RATE: 18 BRPM | SYSTOLIC BLOOD PRESSURE: 123 MMHG | DIASTOLIC BLOOD PRESSURE: 89 MMHG | BODY MASS INDEX: 29.19 KG/M2

## 2025-02-18 DIAGNOSIS — B37.0 ORAL THRUSH: Primary | ICD-10-CM

## 2025-02-18 PROCEDURE — 99213 OFFICE O/P EST LOW 20 MIN: CPT | Performed by: FAMILY MEDICINE

## 2025-02-18 PROCEDURE — 1125F AMNT PAIN NOTED PAIN PRSNT: CPT | Performed by: FAMILY MEDICINE

## 2025-02-18 PROCEDURE — 1160F RVW MEDS BY RX/DR IN RCRD: CPT | Performed by: FAMILY MEDICINE

## 2025-02-18 PROCEDURE — 1159F MED LIST DOCD IN RCRD: CPT | Performed by: FAMILY MEDICINE

## 2025-02-18 RX ORDER — NYSTATIN 100000 [USP'U]/ML
500000 SUSPENSION ORAL 4 TIMES DAILY
Qty: 473 ML | Refills: 0 | Status: SHIPPED | OUTPATIENT
Start: 2025-02-18 | End: 2025-02-25

## 2025-02-18 NOTE — PROGRESS NOTES
"Subjective cc: mouth irritation   Ezekiel Palmer is a 24 y.o. male.     History of Present Illness  The patient presents for evaluation of oral thrush.    He underwent a cholecystectomy 1 week ago, following which he experienced throat swelling and the appearance of white patches. A strep test was conducted at [facility], which returned negative results. Despite this, he was prescribed antibiotics due to the visual similarity of his symptoms to strep throat. He completed a 5-day course of antibiotics, with the last dose taken yesterday. He reports that his tongue has developed a white coating, which he attributes to the antibiotic treatment. He also notes that while the initial throat swelling has subsided, he continues to experience discomfort. He has been maintaining oral hygiene by brushing his teeth and avoiding sugary drinks. He is currently on a strict diet and is seeking advice on dietary modifications post-gallbladder surgery. He suspects he may be experiencing bile acid malabsorption, as evidenced by the presence of light yellow, watery stools.    Results  Laboratory Studies  Strep throat test was negative.    The following portions of the patient's history were reviewed and updated as appropriate: allergies, current medications, past family history, past medical history, past social history, past surgical history, and problem list.        A review of systems was performed, and pertinent findings are noted in the HPI.    Objective   /89 (BP Location: Left arm, Patient Position: Sitting, Cuff Size: Large Adult)   Pulse 72   Temp 98.4 °F (36.9 °C) (Infrared)   Resp 18   Ht 170.2 cm (67\")   Wt 84.4 kg (186 lb)   SpO2 97%   BMI 29.13 kg/m²          Physical Exam  Vitals and nursing note reviewed.   Constitutional:       General: He is not in acute distress.     Appearance: He is not toxic-appearing.   HENT:      Head: Normocephalic and atraumatic.      Right Ear: External ear normal.      " Left Ear: External ear normal.      Nose: Nose normal.      Mouth/Throat:      Comments: Oral thrush   Eyes:      General:         Right eye: No discharge.         Left eye: No discharge.      Conjunctiva/sclera: Conjunctivae normal.   Cardiovascular:      Pulses: Normal pulses.   Pulmonary:      Effort: Pulmonary effort is normal. No respiratory distress.   Skin:     General: Skin is warm and dry.   Neurological:      Mental Status: He is alert and oriented to person, place, and time.   Psychiatric:         Mood and Affect: Mood normal.         Behavior: Behavior normal.         Thought Content: Thought content normal.         Judgment: Judgment normal.         Assessment & Plan   Problems Addressed this Visit    None  Visit Diagnoses       Oral thrush    -  Primary    Relevant Medications    nystatin (MYCOSTATIN) 100,000 unit/mL suspension          Diagnoses         Codes Comments    Oral thrush    -  Primary ICD-10-CM: B37.0  ICD-9-CM: 112.0             Assessment & Plan  1. Oral thrush.  The patient's symptoms do not align with a diagnosis of strep throat. A mouthwash has been prescribed, to be used 4 times daily for a duration of 1 week. He is advised to discard his current toothbrush and replace it with a new one. Dietary recommendations include the avoidance of high-sugar foods and drinks, as well as yeast-containing foods such as bread and pasta. He is also advised to avoid sharing drinks with others and to ensure thorough cleaning of all dishes and utensils after use. From a gallbladder standpoint, there are no major dietary restrictions if he is feeling okay. He is encouraged to try lean proteins like chicken and fish, and to gradually ease towards a normal diet. [facility]. If symptoms persist beyond the 7-day period, he is instructed to inform us so that a refill on the prescription can be provided.    PROCEDURE  The patient underwent a  cholecystectomy 1 week ago.               Transcribed from ambient dictation for Jazzy Infante MD by Jazzy Infante MD.  02/18/25   09:27 CST    Patient or patient representative verbalized consent for the use of Ambient Listening during the visit with  Jazzy Infante MD for chart documentation. 2/18/2025  09:27 CST          This document has been electronically signed by Jazzy Infante MD on February 18, 2025 09:33 CST      Answers submitted by the patient for this visit:  Problem not listed (Submitted on 2/17/2025)  Chief Complaint: Other medical problem  Reason for appointment: Oral Trush post gallbladder surgery  abdominal pain: Yes  anorexia: No  joint pain: No  change in stool: Yes  chest pain: No  chills: No  nasal congestion: No  cough: Yes  diaphoresis: Yes  fatigue: Yes  fever: No  headaches: Yes  joint swelling: No  myalgias: No  nausea: Yes  neck pain: No  numbness: Yes  rash: No  sore throat: Yes  swollen glands: Yes  dysuria: No  vertigo: No  visual change: No  vomiting: Yes  weakness: Yes  Other symptom: White patches on tongue that when scraped bleed slightly  Onset: in the past 7 days  Chronicity: new  Frequency: constantly  Medications tried: Cough Drops, Salt Water Gargles, Herbel Teas, and Azithromycin (prescribed when the swelling was considered Strep)

## 2025-03-04 ENCOUNTER — OFFICE VISIT (OUTPATIENT)
Dept: SURGERY | Facility: CLINIC | Age: 25
End: 2025-03-04
Payer: COMMERCIAL

## 2025-03-04 VITALS
HEART RATE: 83 BPM | OXYGEN SATURATION: 99 % | BODY MASS INDEX: 29.19 KG/M2 | WEIGHT: 186 LBS | DIASTOLIC BLOOD PRESSURE: 80 MMHG | HEIGHT: 67 IN | SYSTOLIC BLOOD PRESSURE: 120 MMHG

## 2025-03-04 DIAGNOSIS — Z90.49 HISTORY OF LAPAROSCOPIC CHOLECYSTECTOMY: Primary | ICD-10-CM

## 2025-03-04 DIAGNOSIS — K81.1 CHRONIC CHOLECYSTITIS: ICD-10-CM

## 2025-03-04 PROCEDURE — 1159F MED LIST DOCD IN RCRD: CPT | Performed by: SURGERY

## 2025-03-04 PROCEDURE — 1160F RVW MEDS BY RX/DR IN RCRD: CPT | Performed by: SURGERY

## 2025-03-04 PROCEDURE — 99024 POSTOP FOLLOW-UP VISIT: CPT | Performed by: SURGERY

## 2025-03-04 NOTE — PROGRESS NOTES
GENERAL SURGERY OFFICE FOLLOW UP VISIT    Referring Provider: No ref. provider found  Primary Care Provider: Jazzy Infante MD    Chief Complaint   Patient presents with    Post-op Follow-up       Subjective     Mr. Spencer matos to clinic for follow-up after undergoing a laparoscopic cholecystectomy with intraoperative cholangiogram.  He reports that he is feeling much better after surgery.  His nausea has pretty much resolved.  He is tolerating his normal diet.  He is having some diarrhea after eating, but it is not particularly bothersome.    History:  Past Medical History:   Diagnosis Date    Acute gastroenteritis     Allergic rhinitis     Diarrhea     Exposure to tobacco smoke     Gastroenteritis     GERD (gastroesophageal reflux disease)     Headache     Nausea and vomiting     with body aches    Rhinitis     Tachycardia     PATIENT STATES HE DOES NOT HAVE THIS THAT HE KNOWS OF    Upper respiratory infection     Well child check    ,   Past Surgical History:   Procedure Laterality Date    CHOLECYSTECTOMY WITH INTRAOPERATIVE CHOLANGIOGRAM N/A 2/11/2025    Procedure: LAPAROSCOPIC CHOLECYSTECTOMY;  Surgeon: Rodney Puente MD;  Location: [facility] OR;  Service: General;  Laterality: N/A;    DENTAL PROCEDURE  07/03/2001    frenuloplasty Dr. Peralta    ENDOSCOPY N/A 7/28/2017    Procedure: ESOPHAGOGASTRODUODENOSCOPY possible dilation ;  Surgeon: Rodney Huber MD;  [facility];  Service:     ENDOSCOPY N/A 11/16/2020    Procedure: ESOPHAGOGASTRODUODENOSCOPY WITH ANESTHESIA;  Surgeon: Bill Rasheed MD;  Location: [facility] ENDOSCOPY;  Service: Gastroenterology;  Laterality: N/A;  pre op: gerd  post op:hiatal hernia, esophagitis  PCP: Jazzy Infante MD    ENDOSCOPY N/A 5/9/2023    Procedure: ESOPHAGOGASTRODUODENOSCOPY WITH ANESTHESIA;  Surgeon: Bill Rasheed MD;  Location: [facility] ENDOSCOPY;  Service: Gastroenterology;  Laterality: N/A;  preop: nausa; right upper quadrant pain  post op:  esophagitis; hiatal hernia   PCP:  Jazzy Infante MD     ENDOSCOPY N/A 2024    Procedure: ESOPHAGOGASTRODUODENOSCOPY WITH ANESTHESIA;  Surgeon: Bill Rasheed MD;  Location: [facility] ENDOSCOPY;  Service: Gastroenterology;  Laterality: N/A;  pre esophagitis  post esophagitis  Dr. Infante    TYMPANOSTOMY  2001    Dr. Peralta   ,   Family History   Problem Relation Age of Onset    Diabetes Mother     Alcohol abuse Mother     Drug abuse Mother     Heart attack Maternal Grandfather     Hypertension Maternal Grandfather     Diabetes Maternal Grandfather     Lung cancer Other     Stroke Maternal Grandmother     Colon cancer Neg Hx     Colon polyps Neg Hx    ,   Social History     Tobacco Use    Smoking status: Former     Types: Electronic Cigarette     Quit date: 2024     Years since quittin.6     Passive exposure: Never    Smokeless tobacco: Never   Vaping Use    Vaping status: Former    Substances: Nicotine, Flavoring    Devices: Disposable, Pre-filled or refillable cartridge, Pre-filled pod    Passive vaping exposure: Yes   Substance Use Topics    Alcohol use: Yes     Comment: social    Drug use: Yes     Types: Marijuana       Current Outpatient Medications:     esomeprazole (nexIUM) 40 MG capsule, Take 1 capsule by mouth 2 (Two) Times a Day., Disp: 60 capsule, Rfl: 11    fluticasone (FLONASE) 50 MCG/ACT nasal spray, 2 sprays into the nostril(s) as directed by provider 2 (Two) Times a Day., Disp: 48 g, Rfl: 3    loratadine (CLARITIN) 10 MG tablet, Take 1 tablet by mouth As Needed., Disp: , Rfl:     ondansetron (Zofran) 4 MG tablet, Take 1 tablet by mouth Every 8 (Eight) Hours As Needed for Nausea or Vomiting., Disp: 15 tablet, Rfl: 0    sucralfate (Carafate) 1 g tablet, Take 1 tablet by mouth 4 (Four) Times a Day., Disp: 120 tablet, Rfl: 3    Allergies:  Mold extract [trichophyton]      The following portions of the patient's history were reviewed and updated as appropriate: allergies, current  "medications, past family history, past medical history, past social history, past surgical history, and problem list.      Review of Systems  A comprehensive 14 point review of systems was performed and is negative unless otherwise noted      Objective   /80 (BP Location: Left arm, Patient Position: Sitting, Cuff Size: Adult)   Pulse 83   Ht 170.2 cm (67\")   Wt 84.4 kg (186 lb)   SpO2 99%   BMI 29.13 kg/m²     BMI followup discussion/instruction with patient: none (medical contraindication)      Physical Exam  Well-appearing adult male.  Abdomen is flat, incisions are healed.      Labs:  I personally reviewed all pertinent labs.   Imaging: I personally reviewed all pertinent imaging studies.   Pathology: Chronic cholecystitis    Assessment & Plan   Diagnoses and all orders for this visit:    1. History of laparoscopic cholecystectomy (Primary)    2. Chronic cholecystitis    Pathology was benign, showed chronic cholecystitis.  The patient is doing well after surgery and happy with his results.  I have recommended that he try over-the-counter antidiarrheal such as Imodium       Thank you for the referral and trusting me with the care of your patient.    Rodney Puente MD, FACS  General Surgery  3/4/2025  13:09 CST    Please note that portions of this note were completed with a voice recognition program.     Answers submitted by the patient for this visit:  Post Operative Visit (Submitted on 3/4/2025)  Chief Complaint: Follow-up  Pain Control: well controlled  Fever: no fever  Diet: adequate intake, nausea, vomiting  Activity: normal  Operative Site Issues: No  Additional information: Recovery has been fine just been having some rough poops and some nausea every now and then but its no where near what it used to be like    "

## 2025-04-15 ENCOUNTER — OFFICE VISIT (OUTPATIENT)
Dept: GASTROENTEROLOGY | Facility: CLINIC | Age: 25
End: 2025-04-15
Payer: COMMERCIAL

## 2025-04-15 VITALS
DIASTOLIC BLOOD PRESSURE: 80 MMHG | HEART RATE: 88 BPM | WEIGHT: 183 LBS | SYSTOLIC BLOOD PRESSURE: 110 MMHG | HEIGHT: 67 IN | TEMPERATURE: 97.5 F | OXYGEN SATURATION: 99 % | BODY MASS INDEX: 28.72 KG/M2

## 2025-04-15 DIAGNOSIS — K21.00 GASTROESOPHAGEAL REFLUX DISEASE WITH ESOPHAGITIS WITHOUT HEMORRHAGE: ICD-10-CM

## 2025-04-15 DIAGNOSIS — K20.90 ESOPHAGITIS: Primary | ICD-10-CM

## 2025-04-15 RX ORDER — ESOMEPRAZOLE MAGNESIUM 40 MG/1
40 CAPSULE, DELAYED RELEASE ORAL 2 TIMES DAILY
Qty: 60 CAPSULE | Refills: 11 | Status: SHIPPED | OUTPATIENT
Start: 2025-04-15

## 2025-04-15 NOTE — PROGRESS NOTES
[facility] GASTROENTEROLOGY - OFFICE NOTE    4/15/2025    Ezekiel Palmer   2000    Primary Physician: Jazzy Infante MD    Chief Complaint   Patient presents with    Follow-up     Post gal bladder removal   Gerd         HISTORY OF PRESENT ILLNESS:     Ezekiel Palmer is a 24 y.o. male presents for follow up gerd and esophagitis. Reflux symptoms have much improved since lap cholecystectomy 2/2025. Ran out of nexium 2 weeks ago.  No dysphagia.       The patient has chronic significant esophagitis. Dr. Rasheed recommended referral to surgery for nissen fundoplication. He was seen by Dr. Puente and had lap cholecystectomy 2/2025. No nissen was done.         UPPER GI ENDOSCOPY (02/20/2024 13:55)     Past Medical History:   Diagnosis Date    Acute gastroenteritis     Allergic rhinitis     Diarrhea     Exposure to tobacco smoke     Gastroenteritis     GERD (gastroesophageal reflux disease)     Headache     Nausea and vomiting     with body aches    Rhinitis     Tachycardia     PATIENT STATES HE DOES NOT HAVE THIS THAT HE KNOWS OF    Upper respiratory infection     Well child check        Past Surgical History:   Procedure Laterality Date    CHOLECYSTECTOMY WITH INTRAOPERATIVE CHOLANGIOGRAM N/A 2/11/2025    Procedure: LAPAROSCOPIC CHOLECYSTECTOMY;  Surgeon: Rodney Puente MD;  Location: [facility] OR;  Service: General;  Laterality: N/A;    DENTAL PROCEDURE  07/03/2001    frenuloplasty Dr. Peralta    ENDOSCOPY N/A 7/28/2017    Procedure: ESOPHAGOGASTRODUODENOSCOPY possible dilation ;  Surgeon: Rodney Huber MD;  Location: [facility] ENDOSCOPY;  Service:     ENDOSCOPY N/A 11/16/2020    Procedure: ESOPHAGOGASTRODUODENOSCOPY WITH ANESTHESIA;  Surgeon: Bill Rasheed MD;  Location: [facility] ENDOSCOPY;  Service: Gastroenterology;  Laterality: N/A;  pre op: gerd  post op:hiatal hernia, esophagitis  PCP: Jazzy Infante MD    ENDOSCOPY N/A 5/9/2023    Procedure: ESOPHAGOGASTRODUODENOSCOPY WITH  ANESTHESIA;  Surgeon: Bill Rasheed MD;  Location:  [facility] ENDOSCOPY;  Service: Gastroenterology;  Laterality: N/A;  preop: nausa; right upper quadrant pain  post op: esophagitis; hiatal hernia   PCP:  Jazzy Infante MD     ENDOSCOPY N/A 2/20/2024    Procedure: ESOPHAGOGASTRODUODENOSCOPY WITH ANESTHESIA;  Surgeon: Bill Rasheed MD;  Location:  [facility] ENDOSCOPY;  Service: Gastroenterology;  Laterality: N/A;  pre esophagitis  post esophagitis  Dr. Inafnte    TYMPANOSTOMY  07/03/2001    Dr. Peralta       Outpatient Medications Marked as Taking for the 4/15/25 encounter (Office Visit) with Ro Hill APRN   Medication Sig Dispense Refill    esomeprazole (nexIUM) 40 MG capsule Take 1 capsule by mouth 2 (Two) Times a Day. 60 capsule 11    fluticasone (FLONASE) 50 MCG/ACT nasal spray 2 sprays into the nostril(s) as directed by provider 2 (Two) Times a Day. 48 g 3    loratadine (CLARITIN) 10 MG tablet Take 1 tablet by mouth As Needed.      ondansetron (Zofran) 4 MG tablet Take 1 tablet by mouth Every 8 (Eight) Hours As Needed for Nausea or Vomiting. 15 tablet 0    sucralfate (Carafate) 1 g tablet Take 1 tablet by mouth 4 (Four) Times a Day. 120 tablet 3    [DISCONTINUED] esomeprazole (nexIUM) 40 MG capsule Take 1 capsule by mouth 2 (Two) Times a Day. 60 capsule 11       Allergies   Allergen Reactions    Mold Extract [Trichophyton] Other (See Comments)     fever       Social History     Socioeconomic History    Marital status: Single   Tobacco Use    Smoking status: Former     Passive exposure: Never    Smokeless tobacco: Never   Vaping Use    Vaping status: Former    Substances: Nicotine, Flavoring    Devices: Disposable, Pre-filled or refillable cartridge, Pre-filled pod    Passive vaping exposure: Yes   Substance and Sexual Activity    Alcohol use: Yes     Comment: social    Drug use: Yes     Types: Marijuana    Sexual activity: Not Currently     Partners: Female     Birth control/protection: Condom, Birth  "control pill       Family History   Problem Relation Age of Onset    Diabetes Mother     Alcohol abuse Mother     Drug abuse Mother     Heart attack Maternal Grandfather     Hypertension Maternal Grandfather     Diabetes Maternal Grandfather     Lung cancer Other     Stroke Maternal Grandmother     Colon cancer Neg Hx     Colon polyps Neg Hx        Review of Systems   Constitutional:  Negative for chills, fever and unexpected weight change.   Respiratory:  Negative for shortness of breath.    Cardiovascular:  Negative for chest pain.   Gastrointestinal:  Negative for abdominal distention, abdominal pain, nausea and vomiting.        Vitals:    04/15/25 1421   BP: 110/80   Pulse: 88   Temp: 97.5 °F (36.4 °C)   SpO2: 99%   Weight: 83 kg (183 lb)   Height: 170.2 cm (67\")      Body mass index is 28.66 kg/m².    Physical Exam  Vitals reviewed.   Constitutional:       General: He is not in acute distress.  Cardiovascular:      Rate and Rhythm: Normal rate and regular rhythm.      Heart sounds: Normal heart sounds.   Pulmonary:      Effort: Pulmonary effort is normal.      Breath sounds: Normal breath sounds.   Abdominal:      General: Bowel sounds are normal. There is no distension.      Palpations: Abdomen is soft.      Tenderness: There is no abdominal tenderness.   Skin:     General: Skin is warm and dry.   Neurological:      Mental Status: He is alert.         Results for orders placed or performed during the hospital encounter of 02/11/25   Tissue Pathology Exam    Collection Time: 02/11/25  8:20 AM    Specimen: Gallbladder; Tissue   Result Value Ref Range    Note to Patients       This report may contain a detailed description of human tissue sent by a health care provider to the laboratory for pathologic evaluation. The content of this report is essential for diagnosis and may provide important critical findings. This information may be unfamiliar to patients to review without a medical professional present. It is " "advised that the patient review this report in the presence of a health care provider who can answer questions and explain the results.      Case Report       Surgical Pathology Report                         Case: [id number]                                  Authorizing Provider:  Rodney Puente MD           Collected:           02/11/2025 08:20 AM          Ordering Location:     [facility] OR  Received:            02/11/2025 01:27 PM          Pathologist:           Tee Bertrand MD                                                      Specimen:    Gallbladder, Gallbladder and Contents                                                      Final Diagnosis       Gallbladder, cholecystectomy:  Chronic cholecystitis.      Gross Description       1. Gallbladder.  Received in a formalin filled container labeled with the patient's name, medical record number and \"gallbladder and contents\" is an apparent fragment of gallbladder that measures 4.9 x 2.5 x 0.6 cm.  There is a staple on the proximal end.  The cystic duct margin appears disrupted and potentially not present.  The serosal surfaces smooth shimmering and unremarkable.  The hepatic bed margin displays cautery artifact and a potential atherogenic perforation measuring 1 cm in greatest dimension.  Bile is present within the gallbladder.  Stones are absent.  The mucosal surface is velvety, green and unremarkable.  Representative sections of the gallbladder submitted in a cassette labeled 1A.          Microscopic Description       A microscopic evaluation is performed.             ASSESSMENT AND PLAN    Assessment & Plan     Diagnoses and all orders for this visit:    1. Esophagitis (Primary)  -     Case Request; Standing  -     Case Request    2. Gastroesophageal reflux disease with esophagitis without hemorrhage  -     esomeprazole (nexIUM) 40 MG capsule; Take 1 capsule by mouth 2 (Two) Times a Day.  Dispense: 60 capsule; Refill: 11  -     Case " Request; Standing  -     Case Request    Other orders  -     Implement Anesthesia Orders Day of Procedure; Standing  -     Follow Anesthesia Guidelines / Protocol; Future      He has history of significant esophagitis despite ppi therapy.   recommended referral to surgery for nissen fundoplication. He had recent lap cholecystectomy 2/2025 by Dr. Puente, no nissen was done. He has noted improvement of reflux symptoms since lap cholecystectomy. I recommend strict anti reflux precautions. I will refill prescription for nexium twice daily. I recommend repeat egd and he is agreeable.       ESOPHAGOGASTRODUODENOSCOPY WITH ANESTHESIA (N/A)  Risk, benefits, and alternatives of endoscopy were explained in full.  They understand that there is a risk of bleeding, perforation, and infection.  The risk of perforation goes up with esophageal dilation.  Other options to evaluate UGI complaints could involve barium swallow or UGI series, but these would be diagnostic tests only.  Patient was given time to ask questions.  I answered them to their satisfaction and they are agreeable to proceeding.          Return in about 1 year (around 4/15/2026).          There are no Patient Instructions on file for this visit.      JAYNA Chau      Results for orders placed or performed during the hospital encounter of 02/11/25   Tissue Pathology Exam    Collection Time: 02/11/25  8:20 AM    Specimen: Gallbladder; Tissue   Result Value Ref Range    Note to Patients       This report may contain a detailed description of human tissue sent by a health care provider to the laboratory for pathologic evaluation. The content of this report is essential for diagnosis and may provide important critical findings. This information may be unfamiliar to patients to review without a medical professional present. It is advised that the patient review this report in the presence of a health care provider who can answer questions and explain the  "results.      Case Report       Surgical Pathology Report                         Case: [id number]                                  Authorizing Provider:  Rodney Puente MD           Collected:           02/11/2025 08:20 AM          Ordering Location:     [facility] OR  Received:            02/11/2025 01:27 PM          Pathologist:           Tee Bertrand MD                                                      Specimen:    Gallbladder, Gallbladder and Contents                                                      Final Diagnosis       Gallbladder, cholecystectomy:  Chronic cholecystitis.      Gross Description       1. Gallbladder.  Received in a formalin filled container labeled with the patient's name, medical record number and \"gallbladder and contents\" is an apparent fragment of gallbladder that measures 4.9 x 2.5 x 0.6 cm.  There is a staple on the proximal end.  The cystic duct margin appears disrupted and potentially not present.  The serosal surfaces smooth shimmering and unremarkable.  The hepatic bed margin displays cautery artifact and a potential atherogenic perforation measuring 1 cm in greatest dimension.  Bile is present within the gallbladder.  Stones are absent.  The mucosal surface is velvety, green and unremarkable.  Representative sections of the gallbladder submitted in a cassette labeled 1A.          Microscopic Description       A microscopic evaluation is performed.       "

## 2025-05-22 ENCOUNTER — ANESTHESIA (OUTPATIENT)
Dept: GASTROENTEROLOGY | Facility: HOSPITAL | Age: 25
End: 2025-05-22
Payer: COMMERCIAL

## 2025-05-22 ENCOUNTER — ANESTHESIA EVENT (OUTPATIENT)
Dept: GASTROENTEROLOGY | Facility: HOSPITAL | Age: 25
End: 2025-05-22
Payer: COMMERCIAL

## 2025-05-22 ENCOUNTER — HOSPITAL ENCOUNTER (OUTPATIENT)
Facility: HOSPITAL | Age: 25
Setting detail: HOSPITAL OUTPATIENT SURGERY
Discharge: HOME | End: 2025-05-22
Attending: INTERNAL MEDICINE | Admitting: INTERNAL MEDICINE
Payer: COMMERCIAL

## 2025-05-22 VITALS
SYSTOLIC BLOOD PRESSURE: 136 MMHG | OXYGEN SATURATION: 100 % | HEART RATE: 54 BPM | BODY MASS INDEX: 27.78 KG/M2 | DIASTOLIC BLOOD PRESSURE: 82 MMHG | TEMPERATURE: 96.8 F | HEIGHT: 67 IN | RESPIRATION RATE: 20 BRPM | WEIGHT: 177 LBS

## 2025-05-22 DIAGNOSIS — K21.00 GASTROESOPHAGEAL REFLUX DISEASE WITH ESOPHAGITIS WITHOUT HEMORRHAGE: ICD-10-CM

## 2025-05-22 DIAGNOSIS — K20.90 ESOPHAGITIS: ICD-10-CM

## 2025-05-22 PROCEDURE — 25010000002 PROPOFOL 10 MG/ML EMULSION: Performed by: NURSE ANESTHETIST, CERTIFIED REGISTERED

## 2025-05-22 PROCEDURE — 88312 SPECIAL STAINS GROUP 1: CPT | Performed by: INTERNAL MEDICINE

## 2025-05-22 PROCEDURE — 25010000002 LIDOCAINE PF 2% 2 % SOLUTION: Performed by: NURSE ANESTHETIST, CERTIFIED REGISTERED

## 2025-05-22 PROCEDURE — 43239 EGD BIOPSY SINGLE/MULTIPLE: CPT | Performed by: INTERNAL MEDICINE

## 2025-05-22 PROCEDURE — 88305 TISSUE EXAM BY PATHOLOGIST: CPT | Performed by: INTERNAL MEDICINE

## 2025-05-22 PROCEDURE — 25810000003 SODIUM CHLORIDE 0.9 % SOLUTION: Performed by: ANESTHESIOLOGY

## 2025-05-22 RX ORDER — SODIUM CHLORIDE 9 MG/ML
500 INJECTION, SOLUTION INTRAVENOUS CONTINUOUS PRN
Status: DISCONTINUED | OUTPATIENT
Start: 2025-05-22 | End: 2025-05-22 | Stop reason: HOSPADM

## 2025-05-22 RX ORDER — ONDANSETRON 2 MG/ML
4 INJECTION INTRAMUSCULAR; INTRAVENOUS ONCE AS NEEDED
Status: DISCONTINUED | OUTPATIENT
Start: 2025-05-22 | End: 2025-05-22 | Stop reason: HOSPADM

## 2025-05-22 RX ORDER — SODIUM CHLORIDE 0.9 % (FLUSH) 0.9 %
10 SYRINGE (ML) INJECTION AS NEEDED
Status: DISCONTINUED | OUTPATIENT
Start: 2025-05-22 | End: 2025-05-22 | Stop reason: HOSPADM

## 2025-05-22 RX ORDER — LIDOCAINE HYDROCHLORIDE 10 MG/ML
0.5 INJECTION, SOLUTION EPIDURAL; INFILTRATION; INTRACAUDAL; PERINEURAL ONCE AS NEEDED
Status: DISCONTINUED | OUTPATIENT
Start: 2025-05-22 | End: 2025-05-22 | Stop reason: HOSPADM

## 2025-05-22 RX ORDER — LIDOCAINE HYDROCHLORIDE 20 MG/ML
INJECTION, SOLUTION EPIDURAL; INFILTRATION; INTRACAUDAL; PERINEURAL AS NEEDED
Status: DISCONTINUED | OUTPATIENT
Start: 2025-05-22 | End: 2025-05-22 | Stop reason: SURG

## 2025-05-22 RX ORDER — PROPOFOL 10 MG/ML
VIAL (ML) INTRAVENOUS AS NEEDED
Status: DISCONTINUED | OUTPATIENT
Start: 2025-05-22 | End: 2025-05-22 | Stop reason: SURG

## 2025-05-22 RX ADMIN — PROPOFOL 260 MG: 10 INJECTION, EMULSION INTRAVENOUS at 09:32

## 2025-05-22 RX ADMIN — GLYCOPYRROLATE 0.1 MG: 0.2 INJECTION INTRAMUSCULAR; INTRAVENOUS at 09:26

## 2025-05-22 RX ADMIN — LIDOCAINE HYDROCHLORIDE 150 MG: 20 INJECTION, SOLUTION EPIDURAL; INFILTRATION; INTRACAUDAL; PERINEURAL at 09:32

## 2025-05-22 RX ADMIN — SODIUM CHLORIDE 500 ML: 9 INJECTION, SOLUTION INTRAVENOUS at 08:10

## 2025-05-22 NOTE — ANESTHESIA POSTPROCEDURE EVALUATION
Patient: Ezekiel Palmer    Procedure Summary       Date: 05/22/25 Room / Location: [facility] ENDOSCOPY 4 / [facility] ENDOSCOPY    Anesthesia Start: 0925 Anesthesia Stop: 0942    Procedure: ESOPHAGOGASTRODUODENOSCOPY WITH ANESTHESIA Diagnosis:       Gastroesophageal reflux disease with esophagitis without hemorrhage      Esophagitis      (Gastroesophageal reflux disease with esophagitis without hemorrhage [K21.00])      (Esophagitis [K20.90])    Surgeons: Bill Rasheed MD Provider: Maday Lee CRNA    Anesthesia Type: MAC ASA Status: 2            Anesthesia Type: MAC    Vitals  Vitals Value Taken Time   /73 05/22/25 10:05   Temp     Pulse 57 05/22/25 10:05   Resp 18 05/22/25 10:05   SpO2 100 % 05/22/25 10:05           Post Anesthesia Care and Evaluation    Patient location during evaluation: PHASE II  Patient participation: complete - patient participated  Level of consciousness: awake and alert  Pain management: adequate    Airway patency: patent  Anesthetic complications: No anesthetic complications  PONV Status: none  Cardiovascular status: acceptable  Respiratory status: acceptable  Hydration status: acceptable  No anesthesia care post op

## 2025-05-22 NOTE — ANESTHESIA PREPROCEDURE EVALUATION
Anesthesia Evaluation     Patient summary reviewed   no history of anesthetic complications:   NPO Solid Status: > 8 hours             Airway   Mallampati: I  TM distance: >3 FB  No difficulty expected  Dental - normal exam     Pulmonary    (+) a smoker Former,  Cardiovascular - negative cardio ROS  Exercise tolerance: excellent (>7 METS)        Neuro/Psych- negative ROS  GI/Hepatic/Renal/Endo    (+) GERD    Musculoskeletal     Abdominal    Substance History   (+) drug use (marijuana)     OB/GYN          Other                          Anesthesia Plan    ASA 2     MAC       Anesthetic plan, risks, benefits, and alternatives have been provided, discussed and informed consent has been obtained with: patient.        CODE STATUS:

## 2025-05-22 NOTE — H&P
[facility] Gastroenterology  Pre Procedure History & Physical    Chief Complaint:   GERD    Subjective     HPI:   He has a long history of reflux esophagitis.  He currently is asymptomatic on twice daily PPI therapy.  He is been asymptomatic with persist esophagitis before.  He presents for endoscopy evaluation    Past Medical History:   Past Medical History:   Diagnosis Date    Acute gastroenteritis     Allergic rhinitis     Diarrhea     Exposure to tobacco smoke     Gastroenteritis     GERD (gastroesophageal reflux disease)     Headache     Nausea and vomiting     with body aches    Rhinitis     Tachycardia     PATIENT STATES HE DOES NOT HAVE THIS THAT HE KNOWS OF    Upper respiratory infection     Well child check        Past Surgical History:  Past Surgical History:   Procedure Laterality Date    CHOLECYSTECTOMY WITH INTRAOPERATIVE CHOLANGIOGRAM N/A 2/11/2025    Procedure: LAPAROSCOPIC CHOLECYSTECTOMY;  Surgeon: Rodney Puente MD;  Location: [facility] OR;  Service: General;  Laterality: N/A;    DENTAL PROCEDURE  07/03/2001    frenuloplasty Dr. Peralta    ENDOSCOPY N/A 7/28/2017    Procedure: ESOPHAGOGASTRODUODENOSCOPY possible dilation ;  Surgeon: Rodney Huber MD;  Location: [facility] ENDOSCOPY;  Service:     ENDOSCOPY N/A 11/16/2020    Procedure: ESOPHAGOGASTRODUODENOSCOPY WITH ANESTHESIA;  Surgeon: Bill Rasheed MD;  Location: [facility] ENDOSCOPY;  Service: Gastroenterology;  Laterality: N/A;  pre op: gerd  post op:hiatal hernia, esophagitis  PCP: Jazzy Infante MD    ENDOSCOPY N/A 5/9/2023    Procedure: ESOPHAGOGASTRODUODENOSCOPY WITH ANESTHESIA;  Surgeon: Bill Rasheed MD;  Location: [facility] ENDOSCOPY;  Service: Gastroenterology;  Laterality: N/A;  preop: nausa; right upper quadrant pain  post op: esophagitis; hiatal hernia   PCP:  Jazzy Infante MD     ENDOSCOPY N/A 2/20/2024    Procedure: ESOPHAGOGASTRODUODENOSCOPY WITH ANESTHESIA;  Surgeon: Bill Rasheed MD;  Location: [facility] ENDOSCOPY;  " Service: Gastroenterology;  Laterality: N/A;  pre esophagitis  post esophagitis  Dr. Infante    TYMPANOSTOMY  07/03/2001    Dr. Peralta        Family History:  Family History   Problem Relation Age of Onset    Diabetes Mother     Alcohol abuse Mother     Drug abuse Mother     Heart attack Maternal Grandfather     Hypertension Maternal Grandfather     Diabetes Maternal Grandfather     Lung cancer Other     Stroke Maternal Grandmother     Colon cancer Neg Hx     Colon polyps Neg Hx        Social History:   reports that he has quit smoking. He has never been exposed to tobacco smoke. He has never used smokeless tobacco. He reports current alcohol use. He reports current drug use. Drug: Marijuana.    Medications:   Prior to Admission medications    Medication Sig Start Date End Date Taking? Authorizing Provider   esomeprazole (nexIUM) 40 MG capsule Take 1 capsule by mouth 2 (Two) Times a Day. 4/15/25  Yes Ro Hill APRN   fluticasone (FLONASE) 50 MCG/ACT nasal spray 2 sprays into the nostril(s) as directed by provider 2 (Two) Times a Day. 7/12/23  Yes Jacinto Pyle Jr., MD   sucralfate (Carafate) 1 g tablet Take 1 tablet by mouth 4 (Four) Times a Day. 6/15/23  Yes Jazzy Infante MD   loratadine (CLARITIN) 10 MG tablet Take 1 tablet by mouth As Needed.    Provider, MD Leo   ondansetron (Zofran) 4 MG tablet Take 1 tablet by mouth Every 8 (Eight) Hours As Needed for Nausea or Vomiting. 2/11/25 2/11/26  Rodney Puente MD       Allergies:  Mold extract [trichophyton]    ROS:    General: Weight stable  Respiratory: No SOA  Cardiovascular: No CP    Objective     Blood pressure 116/86, pulse 60, temperature 96.8 °F (36 °C), temperature source Temporal, resp. rate 18, height 170.2 cm (67\"), weight 80.3 kg (177 lb), SpO2 99%.    Physical Exam   Constitutional: Pt is oriented to person, place, and in no distress.   Cardiovascular: Normal rate, regular rhythm.    Pulmonary/Chest: Effort normal. No " respiratory distress.    Abdominal: Nondistended.  Psychiatric: Mood, memory, affect and judgment appear normal.     Assessment & Plan     Diagnosis:  Reflux esophagitis    Anticipated Surgical Procedure:  Endoscopy    The risks, benefits, and alternatives of this procedure have been discussed with the patient or the responsible party- the patient understands and agrees to proceed.    EMR Dragon/transcription disclaimer:  Much of this encounter note is electronic transcription/translation of spoken language to printed text.  The electronic translation of spoken language may be erroneous, or at times, nonsensical words or phrases may be inadvertently transcribed.  Although I have reviewed the note for such errors, some may still exist.

## 2025-06-06 ENCOUNTER — TELEPHONE (OUTPATIENT)
Dept: SURGERY | Facility: CLINIC | Age: 25
End: 2025-06-06
Payer: COMMERCIAL

## 2025-06-06 NOTE — TELEPHONE ENCOUNTER
Spoke with pt regarding his appt that we changed to a different date and time due to the provider being in surgery. Pt is aware of new date and time. Pt will call back to verify if he is able to do the new appt date and time due to having to ask his work. I made sure the pt had our office phone number to call back.     ECC  6/6/2025

## 2025-06-19 ENCOUNTER — OFFICE VISIT (OUTPATIENT)
Dept: SURGERY | Facility: CLINIC | Age: 25
End: 2025-06-19
Payer: COMMERCIAL

## 2025-06-19 VITALS
DIASTOLIC BLOOD PRESSURE: 88 MMHG | WEIGHT: 177 LBS | HEIGHT: 67 IN | OXYGEN SATURATION: 98 % | HEART RATE: 77 BPM | SYSTOLIC BLOOD PRESSURE: 122 MMHG | BODY MASS INDEX: 27.78 KG/M2

## 2025-06-19 DIAGNOSIS — Z90.49 HISTORY OF LAPAROSCOPIC CHOLECYSTECTOMY: Primary | ICD-10-CM

## 2025-06-19 DIAGNOSIS — K22.10 ULCERATIVE ESOPHAGITIS: ICD-10-CM

## 2025-06-19 DIAGNOSIS — K21.00 HIATAL HERNIA WITH GASTROESOPHAGEAL REFLUX DISEASE AND ESOPHAGITIS: ICD-10-CM

## 2025-06-19 DIAGNOSIS — K44.9 HIATAL HERNIA WITH GASTROESOPHAGEAL REFLUX DISEASE AND ESOPHAGITIS: ICD-10-CM

## 2025-06-19 PROBLEM — K21.9 HIATAL HERNIA WITH GASTROESOPHAGEAL REFLUX: Status: ACTIVE | Noted: 2025-06-19

## 2025-06-19 RX ORDER — GABAPENTIN 100 MG/1
300 CAPSULE ORAL ONCE
OUTPATIENT
Start: 2025-06-19

## 2025-06-19 RX ORDER — ONDANSETRON 2 MG/ML
4 INJECTION INTRAMUSCULAR; INTRAVENOUS EVERY 6 HOURS PRN
OUTPATIENT
Start: 2025-06-19

## 2025-06-19 RX ORDER — SODIUM CHLORIDE 9 MG/ML
40 INJECTION, SOLUTION INTRAVENOUS AS NEEDED
OUTPATIENT
Start: 2025-06-19

## 2025-06-19 RX ORDER — SODIUM CHLORIDE 0.9 % (FLUSH) 0.9 %
10 SYRINGE (ML) INJECTION EVERY 12 HOURS SCHEDULED
OUTPATIENT
Start: 2025-06-19

## 2025-06-19 RX ORDER — CELECOXIB 100 MG/1
200 CAPSULE ORAL ONCE
OUTPATIENT
Start: 2025-06-19 | End: 2025-06-19

## 2025-06-19 RX ORDER — SODIUM CHLORIDE 0.9 % (FLUSH) 0.9 %
10 SYRINGE (ML) INJECTION AS NEEDED
OUTPATIENT
Start: 2025-06-19

## 2025-06-19 RX ORDER — ACETAMINOPHEN 325 MG/1
650 TABLET ORAL ONCE
OUTPATIENT
Start: 2025-06-19 | End: 2025-06-19

## 2025-06-19 NOTE — PROGRESS NOTES
GENERAL SURGERY OFFICE FOLLOW UP VISIT    Referring Provider: No ref. provider found  Primary Care Provider: Jazzy Infante MD    Chief Complaint   Patient presents with    Follow-up       Subjective     Mr. Palmer presents to clinic for follow-up regarding a pre-existing small hiatal hernia, reflux and esophagitis.  He is 4 months status post laparoscopic cholecystectomy.  He reports that he has not experienced any further nausea or vomiting since his surgery.  He does not experience any significant reflux symptoms and is compliant on his Nexium, which he takes at the same time every day.  His appetite is good.  He does not have any diet intolerance    History:  Past Medical History:   Diagnosis Date    Acute gastroenteritis     Allergic rhinitis     Diarrhea     Exposure to tobacco smoke     Gastroenteritis     GERD (gastroesophageal reflux disease)     Headache     Nausea and vomiting     with body aches    Rhinitis     Tachycardia     PATIENT STATES HE DOES NOT HAVE THIS THAT HE KNOWS OF    Upper respiratory infection     Well child check    ,   Past Surgical History:   Procedure Laterality Date    CHOLECYSTECTOMY WITH INTRAOPERATIVE CHOLANGIOGRAM N/A 2/11/2025    Procedure: LAPAROSCOPIC CHOLECYSTECTOMY;  Surgeon: Rodney Puente MD;  Location: [facility] OR;  Service: General;  Laterality: N/A;    DENTAL PROCEDURE  07/03/2001    frenuloplasty Dr. Peralta    ENDOSCOPY N/A 7/28/2017    Procedure: ESOPHAGOGASTRODUODENOSCOPY possible dilation ;  Surgeon: Rodney Huber MD;  Location: [facility] ENDOSCOPY;  Service:     ENDOSCOPY N/A 11/16/2020    Procedure: ESOPHAGOGASTRODUODENOSCOPY WITH ANESTHESIA;  Surgeon: Bill Rasheed MD;  Location: [facility] ENDOSCOPY;  Service: Gastroenterology;  Laterality: N/A;  pre op: gerd  post op:hiatal hernia, esophagitis  PCP: Jazzy Infante MD    ENDOSCOPY N/A 5/9/2023    Procedure: ESOPHAGOGASTRODUODENOSCOPY WITH ANESTHESIA;  Surgeon: Bill Rasheed MD;  Location:   [facility] ENDOSCOPY;  Service: Gastroenterology;  Laterality: N/A;  preop: nausa; right upper quadrant pain  post op: esophagitis; hiatal hernia   PCP:  Jazzy Infante MD     ENDOSCOPY N/A 2/20/2024    Procedure: ESOPHAGOGASTRODUODENOSCOPY WITH ANESTHESIA;  Surgeon: Bill Rasheed MD;  Location:  [facility] ENDOSCOPY;  Service: Gastroenterology;  Laterality: N/A;  pre esophagitis  post esophagitis  Dr. Infante    ENDOSCOPY N/A 5/22/2025    Procedure: ESOPHAGOGASTRODUODENOSCOPY WITH ANESTHESIA;  Surgeon: Bill Rasheed MD;  Location:  [facility] ENDOSCOPY;  Service: Gastroenterology;  Laterality: N/A;  pre op : Gastroesophageal reflux disease with esophagitis  post op: esophagitis  pcp: Jazzy Infante MD    TYMPANOSTOMY  07/03/2001    Dr. Peralta   ,   Family History   Problem Relation Age of Onset    Diabetes Mother     Alcohol abuse Mother     Drug abuse Mother     Heart attack Maternal Grandfather     Hypertension Maternal Grandfather     Diabetes Maternal Grandfather     Lung cancer Other     Stroke Maternal Grandmother     Colon cancer Neg Hx     Colon polyps Neg Hx    ,   Social History     Tobacco Use    Smoking status: Former     Passive exposure: Never    Smokeless tobacco: Never   Vaping Use    Vaping status: Former    Substances: Nicotine, Flavoring    Devices: Disposable, Pre-filled or refillable cartridge, Pre-filled pod    Passive vaping exposure: Yes   Substance Use Topics    Alcohol use: Yes     Comment: social    Drug use: Yes     Types: Marijuana       Current Outpatient Medications:     esomeprazole (nexIUM) 40 MG capsule, Take 1 capsule by mouth 2 (Two) Times a Day., Disp: 60 capsule, Rfl: 11    fluticasone (FLONASE) 50 MCG/ACT nasal spray, 2 sprays into the nostril(s) as directed by provider 2 (Two) Times a Day., Disp: 48 g, Rfl: 3    loratadine (CLARITIN) 10 MG tablet, Take 1 tablet by mouth As Needed., Disp: , Rfl:     ondansetron (Zofran) 4 MG tablet, Take 1 tablet by mouth Every 8 (Eight)  "Hours As Needed for Nausea or Vomiting., Disp: 15 tablet, Rfl: 0    sucralfate (Carafate) 1 g tablet, Take 1 tablet by mouth 4 (Four) Times a Day., Disp: 120 tablet, Rfl: 3    Allergies:  Mold extract [trichophyton]      The following portions of the patient's history were reviewed and updated as appropriate: allergies, current medications, past family history, past medical history, past social history, past surgical history, and problem list.      Review of Systems  A comprehensive 14 point review of systems was performed and is negative unless otherwise noted      Objective   /88   Pulse 77   Ht 170.2 cm (67\")   Wt 80.3 kg (177 lb)   SpO2 98%   BMI 27.72 kg/m²     BMI followup discussion/instruction with patient: continue with current weight loss program      Physical Exam  Constitutional:       Appearance: Normal appearance. He is normal weight.      Comments: Adult male , in no acute distress. Normal development, normal body habitus. Well nourished   HENT:      Head: Normocephalic and atraumatic.      Right Ear: External ear normal.      Left Ear: External ear normal.      Ears:      Comments: Hearing intact     Nose: Nose normal.      Comments: Nares patent, no septal deviation, no drainage     Mouth/Throat:      Comments: Airway patent, dentition intact, mucus membranes moist  Eyes:      Extraocular Movements: Extraocular movements intact.      Conjunctiva/sclera: Conjunctivae normal.      Pupils: Pupils are equal, round, and reactive to light.      Comments: External eyelids grossly normal, vision intact, no scleral icterus   Neck:      Comments: Trachea midline  Cardiovascular:      Rate and Rhythm: Normal rate.      Comments: Normotensive, no JVD bilaterally  Pulmonary:      Effort: Pulmonary effort is normal.      Breath sounds: Normal breath sounds.      Comments: Normal respiratory rate  Abdominal:      General: Abdomen is flat.      Palpations: Abdomen is soft.      Comments: Non tender. No " scars, hernias or masses.   Musculoskeletal:         General: Normal range of motion.      Cervical back: Normal range of motion.   Skin:     General: Skin is warm and dry.      Comments: Skin color is consistent with ethnicity   Neurological:      General: No focal deficit present.      Mental Status: He is alert and oriented to person, place, and time.   Psychiatric:         Mood and Affect: Mood normal.         Behavior: Behavior normal.         Thought Content: Thought content normal.         Judgment: Judgment normal.      Comments: Patient understands disease process and has decision making capacity.          Labs:  I personally reviewed all pertinent labs.   Imaging: I personally reviewed all pertinent imaging studies.   Pathology:      Assessment & Plan   Diagnoses and all orders for this visit:    1. History of laparoscopic cholecystectomy (Primary)    2. Hiatal hernia with gastroesophageal reflux disease and esophagitis    3. Esophagitis    24-year-old male with ongoing ulcerative esophagitis in the setting of refractory GERD and a small hiatal hernia.  Barium esophagram did not show any evidence of esophageal dysmotility.  He would be an appropriate candidate for minimally invasive hiatal hernia repair and fundoplication.  Given the severity of his esophagitis, we discussed surgery particularly to prevent any progression to Saldivar's esophagus.  He would like to proceed with surgery.    Plan for robotic hiatal hernia repair and fundoplication with EGD.  Ancef 2 g.  Admit for observation    We discussed the postoperative diet progression starting with clear liquids for the first week postop followed by full liquids for the second week postop and soft foods for the 3rd and 4th week postop.  She will need to crush or open up meds and mix with liquid for the first 2 weeks if possible.    I discussed the risks, benefits and alternatives to surgery including risk of injury to surrounding structures including  the esophagus, ongoing or worsening dysphagia, the possibility of requiring a temporary PEG tube, deep organ space and superficial skin infection, injury to major blood vessels and uncontrolled bleeding, the need for blood transfusions, possibility converting to open surgery, possibility of a slipped fundoplication, hiatal hernia recurrence, mesh related complications including mesh infection, mesh migration and mesh failure, chronic pain and the need for more surgery or procedures in the future. Additionally, I counseled the patient on the risk of postoperative cardiopulmonary complications. I gave the patient a chance to ask any questions and answered them thoroughly. The patient understood the risks and was agreeable to proceeding to surgery. Consent was obtained from the patient.    SANDI has been reviewed.  We discussed a postoperative pain medication regimen to include Tylenol, ibuprofen, nonnarcotic adjuncts such as Flexeril and gabapentin, as well as narcotic pain medications which are to be taken exactly as directed.        Thank you for the referral and trusting me with the care of your patient.    Rodney Puente MD, FACS  General Surgery  6/19/2025  11:31 CDT    Please note that portions of this note were completed with a voice recognition program.

## 2025-08-18 ENCOUNTER — PRE-ADMISSION TESTING (OUTPATIENT)
Dept: PREADMISSION TESTING | Facility: HOSPITAL | Age: 25
End: 2025-08-18
Payer: COMMERCIAL

## 2025-08-18 VITALS
HEIGHT: 67 IN | HEART RATE: 71 BPM | DIASTOLIC BLOOD PRESSURE: 75 MMHG | RESPIRATION RATE: 18 BRPM | BODY MASS INDEX: 29.58 KG/M2 | OXYGEN SATURATION: 100 % | SYSTOLIC BLOOD PRESSURE: 131 MMHG | WEIGHT: 188.49 LBS

## 2025-08-18 DIAGNOSIS — K21.00 HIATAL HERNIA WITH GASTROESOPHAGEAL REFLUX DISEASE AND ESOPHAGITIS: ICD-10-CM

## 2025-08-18 DIAGNOSIS — K44.9 HIATAL HERNIA WITH GASTROESOPHAGEAL REFLUX DISEASE AND ESOPHAGITIS: ICD-10-CM

## 2025-08-18 LAB
ALBUMIN SERPL-MCNC: 4.5 G/DL (ref 3.5–5.2)
ALBUMIN/GLOB SERPL: 1.7 G/DL
ALP SERPL-CCNC: 70 U/L (ref 39–117)
ALT SERPL W P-5'-P-CCNC: 73 U/L (ref 1–41)
ANION GAP SERPL CALCULATED.3IONS-SCNC: 9 MMOL/L (ref 5–15)
AST SERPL-CCNC: 50 U/L (ref 1–40)
BILIRUB SERPL-MCNC: 0.7 MG/DL (ref 0–1.2)
BUN SERPL-MCNC: 11.9 MG/DL (ref 6–20)
BUN/CREAT SERPL: 14 (ref 7–25)
CALCIUM SPEC-SCNC: 10.8 MG/DL (ref 8.6–10.5)
CHLORIDE SERPL-SCNC: 104 MMOL/L (ref 98–107)
CO2 SERPL-SCNC: 26 MMOL/L (ref 22–29)
CREAT SERPL-MCNC: 0.85 MG/DL (ref 0.76–1.27)
DEPRECATED RDW RBC AUTO: 39 FL (ref 37–54)
EGFRCR SERPLBLD CKD-EPI 2021: 122.9 ML/MIN/1.73
ERYTHROCYTE [DISTWIDTH] IN BLOOD BY AUTOMATED COUNT: 12.4 % (ref 12.3–15.4)
GLOBULIN UR ELPH-MCNC: 2.6 GM/DL
GLUCOSE SERPL-MCNC: 103 MG/DL (ref 65–99)
HCT VFR BLD AUTO: 46.3 % (ref 37.5–51)
HGB BLD-MCNC: 15.8 G/DL (ref 13–17.7)
MCH RBC QN AUTO: 29.3 PG (ref 26.6–33)
MCHC RBC AUTO-ENTMCNC: 34.1 G/DL (ref 31.5–35.7)
MCV RBC AUTO: 85.9 FL (ref 79–97)
PLATELET # BLD AUTO: 273 10*3/MM3 (ref 140–450)
PMV BLD AUTO: 9.4 FL (ref 6–12)
POTASSIUM SERPL-SCNC: 4.1 MMOL/L (ref 3.5–5.2)
PROT SERPL-MCNC: 7.1 G/DL (ref 6–8.5)
RBC # BLD AUTO: 5.39 10*6/MM3 (ref 4.14–5.8)
SODIUM SERPL-SCNC: 139 MMOL/L (ref 136–145)
WBC NRBC COR # BLD AUTO: 5.6 10*3/MM3 (ref 3.4–10.8)

## 2025-08-18 PROCEDURE — 80053 COMPREHEN METABOLIC PANEL: CPT

## 2025-08-18 PROCEDURE — 36415 COLL VENOUS BLD VENIPUNCTURE: CPT

## 2025-08-18 PROCEDURE — 85027 COMPLETE CBC AUTOMATED: CPT

## 2025-08-21 ENCOUNTER — OFFICE VISIT (OUTPATIENT)
Dept: GASTROENTEROLOGY | Facility: CLINIC | Age: 25
End: 2025-08-21
Payer: COMMERCIAL

## 2025-08-21 VITALS
HEART RATE: 74 BPM | BODY MASS INDEX: 29.66 KG/M2 | OXYGEN SATURATION: 99 % | SYSTOLIC BLOOD PRESSURE: 114 MMHG | WEIGHT: 189 LBS | TEMPERATURE: 97.5 F | HEIGHT: 67 IN | DIASTOLIC BLOOD PRESSURE: 72 MMHG

## 2025-08-21 DIAGNOSIS — K20.90 ESOPHAGITIS: ICD-10-CM

## 2025-08-21 DIAGNOSIS — K21.00 GASTROESOPHAGEAL REFLUX DISEASE WITH ESOPHAGITIS WITHOUT HEMORRHAGE: Primary | ICD-10-CM

## 2025-08-21 RX ORDER — ESOMEPRAZOLE MAGNESIUM 40 MG/1
40 CAPSULE, DELAYED RELEASE ORAL 2 TIMES DAILY
Qty: 60 CAPSULE | Refills: 11 | Status: ON HOLD | OUTPATIENT
Start: 2025-08-21

## 2025-08-22 ENCOUNTER — TELEPHONE (OUTPATIENT)
Dept: SURGERY | Facility: CLINIC | Age: 25
End: 2025-08-22
Payer: COMMERCIAL

## 2025-08-25 ENCOUNTER — ANESTHESIA (OUTPATIENT)
Dept: PERIOP | Facility: HOSPITAL | Age: 25
End: 2025-08-25
Payer: COMMERCIAL

## 2025-08-25 ENCOUNTER — HOSPITAL ENCOUNTER (OUTPATIENT)
Facility: HOSPITAL | Age: 25
Discharge: HOME | End: 2025-08-26
Attending: SURGERY | Admitting: SURGERY
Payer: COMMERCIAL

## 2025-08-25 ENCOUNTER — ANESTHESIA EVENT (OUTPATIENT)
Dept: PERIOP | Facility: HOSPITAL | Age: 25
End: 2025-08-25
Payer: COMMERCIAL

## 2025-08-25 DIAGNOSIS — K21.9 HIATAL HERNIA WITH GASTROESOPHAGEAL REFLUX: Primary | ICD-10-CM

## 2025-08-25 DIAGNOSIS — K44.9 HIATAL HERNIA WITH GASTROESOPHAGEAL REFLUX: Primary | ICD-10-CM

## 2025-08-25 DIAGNOSIS — K20.90 ESOPHAGITIS: ICD-10-CM

## 2025-08-25 DIAGNOSIS — K44.9 HIATAL HERNIA WITH GASTROESOPHAGEAL REFLUX DISEASE AND ESOPHAGITIS: ICD-10-CM

## 2025-08-25 DIAGNOSIS — K21.00 HIATAL HERNIA WITH GASTROESOPHAGEAL REFLUX DISEASE AND ESOPHAGITIS: ICD-10-CM

## 2025-08-25 PROCEDURE — 25010000002 CEFAZOLIN PER 500 MG: Performed by: SURGERY

## 2025-08-25 PROCEDURE — 43281 LAP PARAESOPHAG HERN REPAIR: CPT | Performed by: SURGERY

## 2025-08-25 PROCEDURE — 25010000002 SUGAMMADEX 200 MG/2ML SOLUTION: Performed by: NURSE ANESTHETIST, CERTIFIED REGISTERED

## 2025-08-25 PROCEDURE — 25010000002 MIDAZOLAM PER 1MG: Performed by: ANESTHESIOLOGY

## 2025-08-25 PROCEDURE — 25010000002 VASOPRESSIN 20 UNIT/ML SOLUTION: Performed by: NURSE ANESTHETIST, CERTIFIED REGISTERED

## 2025-08-25 PROCEDURE — 25810000003 LACTATED RINGERS PER 1000 ML: Performed by: SURGERY

## 2025-08-25 PROCEDURE — G0378 HOSPITAL OBSERVATION PER HR: HCPCS

## 2025-08-25 PROCEDURE — 25010000002 ONDANSETRON PER 1 MG: Performed by: SURGERY

## 2025-08-25 PROCEDURE — 25010000002 ROPIVACAINE PER 1 MG: Performed by: SURGERY

## 2025-08-25 PROCEDURE — 25010000002 LIDOCAINE PF 2% 2 % SOLUTION: Performed by: NURSE ANESTHETIST, CERTIFIED REGISTERED

## 2025-08-25 PROCEDURE — 25010000002 FENTANYL CITRATE (PF) 250 MCG/5ML SOLUTION: Performed by: NURSE ANESTHETIST, CERTIFIED REGISTERED

## 2025-08-25 PROCEDURE — 25010000002 INDOCYANINE GREEN 25 MG RECONSTITUTED SOLUTION: Performed by: NURSE ANESTHETIST, CERTIFIED REGISTERED

## 2025-08-25 PROCEDURE — 25010000002 PROPOFOL 10 MG/ML EMULSION: Performed by: NURSE ANESTHETIST, CERTIFIED REGISTERED

## 2025-08-25 PROCEDURE — 25010000002 KETOROLAC TROMETHAMINE PER 15 MG: Performed by: SURGERY

## 2025-08-25 PROCEDURE — 25810000003 LACTATED RINGERS PER 1000 ML: Performed by: NURSE ANESTHETIST, CERTIFIED REGISTERED

## 2025-08-25 PROCEDURE — 43239 EGD BIOPSY SINGLE/MULTIPLE: CPT | Performed by: SURGERY

## 2025-08-25 PROCEDURE — 25010000002 HYDROMORPHONE 1 MG/ML SOLUTION: Performed by: SURGERY

## 2025-08-25 PROCEDURE — 25010000002 DEXAMETHASONE PER 1 MG: Performed by: NURSE ANESTHETIST, CERTIFIED REGISTERED

## 2025-08-25 PROCEDURE — 25010000002 ONDANSETRON PER 1 MG: Performed by: NURSE ANESTHETIST, CERTIFIED REGISTERED

## 2025-08-25 PROCEDURE — S2900 ROBOTIC SURGICAL SYSTEM: HCPCS | Performed by: SURGERY

## 2025-08-25 DEVICE — ABSORBABLE WOUND CLOSURE DEVICE
Type: IMPLANTABLE DEVICE | Site: ABDOMEN | Status: FUNCTIONAL
Brand: V-LOC 90

## 2025-08-25 DEVICE — LARGE LIGATION CLIPS 6 CLIPS/CART
Type: IMPLANTABLE DEVICE | Site: ABDOMEN | Status: FUNCTIONAL
Brand: VAS-Q-CLIP

## 2025-08-25 DEVICE — DEV CONTRL TISS STRATAFIX SPIRAL PP SH1 2-0 1/2 30CM: Type: IMPLANTABLE DEVICE | Site: ABDOMEN | Status: FUNCTIONAL

## 2025-08-25 RX ORDER — MIDAZOLAM HYDROCHLORIDE 2 MG/2ML
2 INJECTION, SOLUTION INTRAMUSCULAR; INTRAVENOUS ONCE
Status: COMPLETED | OUTPATIENT
Start: 2025-08-25 | End: 2025-08-25

## 2025-08-25 RX ORDER — SODIUM CHLORIDE 0.9 % (FLUSH) 0.9 %
10 SYRINGE (ML) INJECTION AS NEEDED
Status: DISCONTINUED | OUTPATIENT
Start: 2025-08-25 | End: 2025-08-25 | Stop reason: HOSPADM

## 2025-08-25 RX ORDER — SODIUM CHLORIDE, SODIUM LACTATE, POTASSIUM CHLORIDE, CALCIUM CHLORIDE 600; 310; 30; 20 MG/100ML; MG/100ML; MG/100ML; MG/100ML
1000 INJECTION, SOLUTION INTRAVENOUS CONTINUOUS
Status: DISCONTINUED | OUTPATIENT
Start: 2025-08-25 | End: 2025-08-25

## 2025-08-25 RX ORDER — DEXAMETHASONE SODIUM PHOSPHATE 4 MG/ML
INJECTION, SOLUTION INTRA-ARTICULAR; INTRALESIONAL; INTRAMUSCULAR; INTRAVENOUS; SOFT TISSUE AS NEEDED
Status: DISCONTINUED | OUTPATIENT
Start: 2025-08-25 | End: 2025-08-25 | Stop reason: SURG

## 2025-08-25 RX ORDER — FENTANYL CITRATE 50 UG/ML
INJECTION, SOLUTION INTRAMUSCULAR; INTRAVENOUS AS NEEDED
Status: DISCONTINUED | OUTPATIENT
Start: 2025-08-25 | End: 2025-08-25 | Stop reason: SURG

## 2025-08-25 RX ORDER — NALOXONE HCL 0.4 MG/ML
0.04 VIAL (ML) INJECTION AS NEEDED
Status: DISCONTINUED | OUTPATIENT
Start: 2025-08-25 | End: 2025-08-25 | Stop reason: HOSPADM

## 2025-08-25 RX ORDER — SODIUM CHLORIDE 9 MG/ML
40 INJECTION, SOLUTION INTRAVENOUS AS NEEDED
Status: DISCONTINUED | OUTPATIENT
Start: 2025-08-25 | End: 2025-08-25 | Stop reason: HOSPADM

## 2025-08-25 RX ORDER — CELECOXIB 200 MG/1
200 CAPSULE ORAL ONCE
Status: COMPLETED | OUTPATIENT
Start: 2025-08-25 | End: 2025-08-25

## 2025-08-25 RX ORDER — ONDANSETRON 2 MG/ML
4 INJECTION INTRAMUSCULAR; INTRAVENOUS
Status: DISCONTINUED | OUTPATIENT
Start: 2025-08-25 | End: 2025-08-25 | Stop reason: HOSPADM

## 2025-08-25 RX ORDER — GABAPENTIN 300 MG/1
300 CAPSULE ORAL EVERY 8 HOURS SCHEDULED
Status: DISCONTINUED | OUTPATIENT
Start: 2025-08-25 | End: 2025-08-26 | Stop reason: HOSPADM

## 2025-08-25 RX ORDER — SODIUM CHLORIDE 0.9 % (FLUSH) 0.9 %
3-10 SYRINGE (ML) INJECTION AS NEEDED
Status: DISCONTINUED | OUTPATIENT
Start: 2025-08-25 | End: 2025-08-25 | Stop reason: HOSPADM

## 2025-08-25 RX ORDER — ACETAMINOPHEN 325 MG/1
650 TABLET ORAL ONCE
Status: COMPLETED | OUTPATIENT
Start: 2025-08-25 | End: 2025-08-25

## 2025-08-25 RX ORDER — ACETAMINOPHEN 500 MG
1000 TABLET ORAL ONCE
Status: DISCONTINUED | OUTPATIENT
Start: 2025-08-25 | End: 2025-08-25 | Stop reason: HOSPADM

## 2025-08-25 RX ORDER — OXYCODONE AND ACETAMINOPHEN 10; 325 MG/1; MG/1
1 TABLET ORAL EVERY 4 HOURS PRN
Status: DISCONTINUED | OUTPATIENT
Start: 2025-08-25 | End: 2025-08-25 | Stop reason: HOSPADM

## 2025-08-25 RX ORDER — SODIUM CHLORIDE 0.9 % (FLUSH) 0.9 %
10 SYRINGE (ML) INJECTION EVERY 12 HOURS SCHEDULED
Status: DISCONTINUED | OUTPATIENT
Start: 2025-08-25 | End: 2025-08-25 | Stop reason: HOSPADM

## 2025-08-25 RX ORDER — SCOPOLAMINE 1 MG/3D
1 PATCH, EXTENDED RELEASE TRANSDERMAL ONCE
Status: DISCONTINUED | OUTPATIENT
Start: 2025-08-25 | End: 2025-08-25

## 2025-08-25 RX ORDER — SODIUM CHLORIDE, SODIUM LACTATE, POTASSIUM CHLORIDE, CALCIUM CHLORIDE 600; 310; 30; 20 MG/100ML; MG/100ML; MG/100ML; MG/100ML
100 INJECTION, SOLUTION INTRAVENOUS CONTINUOUS
Status: DISCONTINUED | OUTPATIENT
Start: 2025-08-25 | End: 2025-08-25

## 2025-08-25 RX ORDER — PROPOFOL 10 MG/ML
VIAL (ML) INTRAVENOUS AS NEEDED
Status: DISCONTINUED | OUTPATIENT
Start: 2025-08-25 | End: 2025-08-25 | Stop reason: SURG

## 2025-08-25 RX ORDER — DROPERIDOL 2.5 MG/ML
0.62 INJECTION, SOLUTION INTRAMUSCULAR; INTRAVENOUS ONCE AS NEEDED
Status: DISCONTINUED | OUTPATIENT
Start: 2025-08-25 | End: 2025-08-25 | Stop reason: HOSPADM

## 2025-08-25 RX ORDER — OXYCODONE HYDROCHLORIDE 5 MG/1
5 TABLET ORAL EVERY 4 HOURS PRN
Refills: 0 | Status: DISCONTINUED | OUTPATIENT
Start: 2025-08-25 | End: 2025-08-26 | Stop reason: HOSPADM

## 2025-08-25 RX ORDER — SODIUM CHLORIDE 0.9 % (FLUSH) 0.9 %
3 SYRINGE (ML) INJECTION AS NEEDED
Status: DISCONTINUED | OUTPATIENT
Start: 2025-08-25 | End: 2025-08-25 | Stop reason: HOSPADM

## 2025-08-25 RX ORDER — FLUMAZENIL 0.1 MG/ML
0.2 INJECTION INTRAVENOUS AS NEEDED
Status: DISCONTINUED | OUTPATIENT
Start: 2025-08-25 | End: 2025-08-25 | Stop reason: HOSPADM

## 2025-08-25 RX ORDER — GABAPENTIN 100 MG/1
100 CAPSULE ORAL EVERY 8 HOURS SCHEDULED
Status: DISCONTINUED | OUTPATIENT
Start: 2025-08-25 | End: 2025-08-25

## 2025-08-25 RX ORDER — DEXTROSE, SODIUM CHLORIDE, SODIUM LACTATE, POTASSIUM CHLORIDE, AND CALCIUM CHLORIDE 5; .6; .31; .03; .02 G/100ML; G/100ML; G/100ML; G/100ML; G/100ML
100 INJECTION, SOLUTION INTRAVENOUS CONTINUOUS
Status: DISPENSED | OUTPATIENT
Start: 2025-08-25 | End: 2025-08-25

## 2025-08-25 RX ORDER — SODIUM CHLORIDE 0.9 % (FLUSH) 0.9 %
10 SYRINGE (ML) INJECTION EVERY 12 HOURS SCHEDULED
Status: DISCONTINUED | OUTPATIENT
Start: 2025-08-25 | End: 2025-08-26 | Stop reason: HOSPADM

## 2025-08-25 RX ORDER — ONDANSETRON 2 MG/ML
INJECTION INTRAMUSCULAR; INTRAVENOUS AS NEEDED
Status: DISCONTINUED | OUTPATIENT
Start: 2025-08-25 | End: 2025-08-25 | Stop reason: SURG

## 2025-08-25 RX ORDER — ONDANSETRON 2 MG/ML
4 INJECTION INTRAMUSCULAR; INTRAVENOUS EVERY 6 HOURS
Status: DISCONTINUED | OUTPATIENT
Start: 2025-08-25 | End: 2025-08-26 | Stop reason: HOSPADM

## 2025-08-25 RX ORDER — LIDOCAINE HYDROCHLORIDE 10 MG/ML
0.5 INJECTION, SOLUTION EPIDURAL; INFILTRATION; INTRACAUDAL; PERINEURAL ONCE AS NEEDED
Status: DISCONTINUED | OUTPATIENT
Start: 2025-08-25 | End: 2025-08-25 | Stop reason: HOSPADM

## 2025-08-25 RX ORDER — GABAPENTIN 300 MG/1
300 CAPSULE ORAL ONCE
Status: COMPLETED | OUTPATIENT
Start: 2025-08-25 | End: 2025-08-25

## 2025-08-25 RX ORDER — SODIUM CHLORIDE, SODIUM LACTATE, POTASSIUM CHLORIDE, CALCIUM CHLORIDE 600; 310; 30; 20 MG/100ML; MG/100ML; MG/100ML; MG/100ML
INJECTION, SOLUTION INTRAVENOUS CONTINUOUS PRN
Status: DISCONTINUED | OUTPATIENT
Start: 2025-08-25 | End: 2025-08-25 | Stop reason: SURG

## 2025-08-25 RX ORDER — FENTANYL CITRATE 50 UG/ML
50 INJECTION, SOLUTION INTRAMUSCULAR; INTRAVENOUS
Status: DISCONTINUED | OUTPATIENT
Start: 2025-08-25 | End: 2025-08-25 | Stop reason: HOSPADM

## 2025-08-25 RX ORDER — LABETALOL HYDROCHLORIDE 5 MG/ML
5 INJECTION, SOLUTION INTRAVENOUS
Status: DISCONTINUED | OUTPATIENT
Start: 2025-08-25 | End: 2025-08-25 | Stop reason: HOSPADM

## 2025-08-25 RX ORDER — SIMETHICONE 80 MG
80 TABLET,CHEWABLE ORAL 4 TIMES DAILY
Status: DISCONTINUED | OUTPATIENT
Start: 2025-08-25 | End: 2025-08-26 | Stop reason: HOSPADM

## 2025-08-25 RX ORDER — INDOCYANINE GREEN AND WATER 25 MG
KIT INJECTION AS NEEDED
Status: DISCONTINUED | OUTPATIENT
Start: 2025-08-25 | End: 2025-08-25 | Stop reason: SURG

## 2025-08-25 RX ORDER — MAGNESIUM HYDROXIDE 1200 MG/15ML
LIQUID ORAL AS NEEDED
Status: DISCONTINUED | OUTPATIENT
Start: 2025-08-25 | End: 2025-08-25 | Stop reason: HOSPADM

## 2025-08-25 RX ORDER — ACETAMINOPHEN 160 MG/5ML
1000 SOLUTION ORAL EVERY 8 HOURS
Status: DISCONTINUED | OUTPATIENT
Start: 2025-08-25 | End: 2025-08-26 | Stop reason: HOSPADM

## 2025-08-25 RX ORDER — ROCURONIUM BROMIDE 10 MG/ML
INJECTION, SOLUTION INTRAVENOUS AS NEEDED
Status: DISCONTINUED | OUTPATIENT
Start: 2025-08-25 | End: 2025-08-25 | Stop reason: SURG

## 2025-08-25 RX ORDER — SODIUM CHLORIDE 9 MG/ML
40 INJECTION, SOLUTION INTRAVENOUS AS NEEDED
Status: DISCONTINUED | OUTPATIENT
Start: 2025-08-25 | End: 2025-08-26 | Stop reason: HOSPADM

## 2025-08-25 RX ORDER — SODIUM CHLORIDE 0.9 % (FLUSH) 0.9 %
10 SYRINGE (ML) INJECTION AS NEEDED
Status: DISCONTINUED | OUTPATIENT
Start: 2025-08-25 | End: 2025-08-26 | Stop reason: HOSPADM

## 2025-08-25 RX ORDER — KETOROLAC TROMETHAMINE 30 MG/ML
15 INJECTION, SOLUTION INTRAMUSCULAR; INTRAVENOUS EVERY 6 HOURS
Status: DISCONTINUED | OUTPATIENT
Start: 2025-08-25 | End: 2025-08-26 | Stop reason: HOSPADM

## 2025-08-25 RX ORDER — OXYCODONE HYDROCHLORIDE 5 MG/1
10 TABLET ORAL EVERY 4 HOURS PRN
Refills: 0 | Status: DISCONTINUED | OUTPATIENT
Start: 2025-08-25 | End: 2025-08-26 | Stop reason: HOSPADM

## 2025-08-25 RX ORDER — SODIUM CHLORIDE 0.9 % (FLUSH) 0.9 %
3 SYRINGE (ML) INJECTION EVERY 12 HOURS SCHEDULED
Status: DISCONTINUED | OUTPATIENT
Start: 2025-08-25 | End: 2025-08-25 | Stop reason: HOSPADM

## 2025-08-25 RX ORDER — HYDROMORPHONE HYDROCHLORIDE 1 MG/ML
0.5 INJECTION, SOLUTION INTRAMUSCULAR; INTRAVENOUS; SUBCUTANEOUS
Status: DISCONTINUED | OUTPATIENT
Start: 2025-08-25 | End: 2025-08-25 | Stop reason: HOSPADM

## 2025-08-25 RX ORDER — LIDOCAINE HYDROCHLORIDE 20 MG/ML
INJECTION, SOLUTION EPIDURAL; INFILTRATION; INTRACAUDAL; PERINEURAL AS NEEDED
Status: DISCONTINUED | OUTPATIENT
Start: 2025-08-25 | End: 2025-08-25 | Stop reason: SURG

## 2025-08-25 RX ORDER — MIDAZOLAM HYDROCHLORIDE 2 MG/2ML
1 INJECTION, SOLUTION INTRAMUSCULAR; INTRAVENOUS
Status: DISCONTINUED | OUTPATIENT
Start: 2025-08-25 | End: 2025-08-25 | Stop reason: HOSPADM

## 2025-08-25 RX ORDER — IBUPROFEN 600 MG/1
600 TABLET, FILM COATED ORAL EVERY 6 HOURS PRN
Status: DISCONTINUED | OUTPATIENT
Start: 2025-08-25 | End: 2025-08-25 | Stop reason: HOSPADM

## 2025-08-25 RX ADMIN — KETOROLAC TROMETHAMINE 15 MG: 30 INJECTION INTRAMUSCULAR; INTRAVENOUS at 15:11

## 2025-08-25 RX ADMIN — CELECOXIB 200 MG: 200 CAPSULE ORAL at 08:24

## 2025-08-25 RX ADMIN — Medication 10 ML: at 20:25

## 2025-08-25 RX ADMIN — ONDANSETRON 4 MG: 2 INJECTION INTRAMUSCULAR; INTRAVENOUS at 12:41

## 2025-08-25 RX ADMIN — KETOROLAC TROMETHAMINE 15 MG: 30 INJECTION INTRAMUSCULAR; INTRAVENOUS at 20:25

## 2025-08-25 RX ADMIN — INDOCYANINE GREEN AND WATER 5 MG: KIT at 12:25

## 2025-08-25 RX ADMIN — ROCURONIUM BROMIDE 20 MG: 10 INJECTION, SOLUTION INTRAVENOUS at 12:09

## 2025-08-25 RX ADMIN — DEXAMETHASONE SODIUM PHOSPHATE 4 MG: 4 INJECTION, SOLUTION INTRAMUSCULAR; INTRAVENOUS at 12:41

## 2025-08-25 RX ADMIN — GABAPENTIN 300 MG: 300 CAPSULE ORAL at 22:12

## 2025-08-25 RX ADMIN — SUGAMMADEX 200 MG: 100 INJECTION, SOLUTION INTRAVENOUS at 12:44

## 2025-08-25 RX ADMIN — ACETAMINOPHEN 650 MG: 325 TABLET ORAL at 08:23

## 2025-08-25 RX ADMIN — ONDANSETRON 4 MG: 2 INJECTION INTRAMUSCULAR; INTRAVENOUS at 20:25

## 2025-08-25 RX ADMIN — INDOCYANINE GREEN AND WATER 5 MG: KIT at 12:09

## 2025-08-25 RX ADMIN — SODIUM CHLORIDE, POTASSIUM CHLORIDE, SODIUM LACTATE AND CALCIUM CHLORIDE 1000 ML: 600; 310; 30; 20 INJECTION, SOLUTION INTRAVENOUS at 08:37

## 2025-08-25 RX ADMIN — CEFAZOLIN 2000 MG: 2 INJECTION, POWDER, FOR SOLUTION INTRAMUSCULAR; INTRAVENOUS at 11:06

## 2025-08-25 RX ADMIN — FENTANYL CITRATE 50 MCG: 50 INJECTION, SOLUTION INTRAMUSCULAR; INTRAVENOUS at 12:52

## 2025-08-25 RX ADMIN — LIDOCAINE HYDROCHLORIDE 100 MG: 20 INJECTION, SOLUTION EPIDURAL; INFILTRATION; INTRACAUDAL; PERINEURAL at 11:01

## 2025-08-25 RX ADMIN — FENTANYL CITRATE 100 MCG: 50 INJECTION, SOLUTION INTRAMUSCULAR; INTRAVENOUS at 11:01

## 2025-08-25 RX ADMIN — GABAPENTIN 300 MG: 300 CAPSULE ORAL at 08:23

## 2025-08-25 RX ADMIN — HYDROMORPHONE HYDROCHLORIDE 0.5 MG: 1 INJECTION, SOLUTION INTRAMUSCULAR; INTRAVENOUS; SUBCUTANEOUS at 22:50

## 2025-08-25 RX ADMIN — MIDAZOLAM HYDROCHLORIDE 2 MG: 1 INJECTION, SOLUTION INTRAMUSCULAR; INTRAVENOUS at 10:26

## 2025-08-25 RX ADMIN — SCOPOLAMINE 1 PATCH: 1.5 PATCH, EXTENDED RELEASE TRANSDERMAL at 10:26

## 2025-08-25 RX ADMIN — ROCURONIUM BROMIDE 50 MG: 10 INJECTION, SOLUTION INTRAVENOUS at 11:01

## 2025-08-25 RX ADMIN — ACETAMINOPHEN 1000 MG: 650 SOLUTION ORAL at 18:30

## 2025-08-25 RX ADMIN — PROPOFOL 200 MG: 10 INJECTION, EMULSION INTRAVENOUS at 11:01

## 2025-08-25 RX ADMIN — SIMETHICONE 80 MG: 80 TABLET, CHEWABLE ORAL at 20:25

## 2025-08-25 RX ADMIN — SODIUM CHLORIDE, POTASSIUM CHLORIDE, SODIUM LACTATE AND CALCIUM CHLORIDE: 600; 310; 30; 20 INJECTION, SOLUTION INTRAVENOUS at 10:51

## 2025-08-25 RX ADMIN — SIMETHICONE 80 MG: 80 TABLET, CHEWABLE ORAL at 15:13

## 2025-08-26 ENCOUNTER — READMISSION MANAGEMENT (OUTPATIENT)
Dept: CALL CENTER | Facility: HOSPITAL | Age: 25
End: 2025-08-26
Payer: COMMERCIAL

## 2025-08-26 VITALS
HEIGHT: 67 IN | HEART RATE: 67 BPM | DIASTOLIC BLOOD PRESSURE: 75 MMHG | WEIGHT: 188.49 LBS | SYSTOLIC BLOOD PRESSURE: 141 MMHG | TEMPERATURE: 97.8 F | BODY MASS INDEX: 29.58 KG/M2 | RESPIRATION RATE: 16 BRPM | OXYGEN SATURATION: 99 %

## 2025-08-26 PROCEDURE — 25010000002 ONDANSETRON PER 1 MG: Performed by: SURGERY

## 2025-08-26 PROCEDURE — 25010000002 KETOROLAC TROMETHAMINE PER 15 MG: Performed by: SURGERY

## 2025-08-26 PROCEDURE — G0378 HOSPITAL OBSERVATION PER HR: HCPCS

## 2025-08-26 RX ORDER — CYCLOBENZAPRINE HCL 5 MG
5 TABLET ORAL EVERY 8 HOURS PRN
Qty: 30 TABLET | Refills: 0 | Status: SHIPPED | OUTPATIENT
Start: 2025-08-26 | End: 2025-09-05

## 2025-08-26 RX ORDER — OXYCODONE HYDROCHLORIDE 5 MG/1
5 TABLET ORAL EVERY 4 HOURS PRN
Qty: 18 TABLET | Refills: 0 | Status: SHIPPED | OUTPATIENT
Start: 2025-08-26 | End: 2025-08-29

## 2025-08-26 RX ADMIN — ONDANSETRON 4 MG: 2 INJECTION INTRAMUSCULAR; INTRAVENOUS at 05:55

## 2025-08-26 RX ADMIN — SIMETHICONE 80 MG: 80 TABLET, CHEWABLE ORAL at 08:49

## 2025-08-26 RX ADMIN — KETOROLAC TROMETHAMINE 15 MG: 30 INJECTION INTRAMUSCULAR; INTRAVENOUS at 14:02

## 2025-08-26 RX ADMIN — ACETAMINOPHEN 1000 MG: 650 SOLUTION ORAL at 00:29

## 2025-08-26 RX ADMIN — Medication 10 ML: at 08:49

## 2025-08-26 RX ADMIN — GABAPENTIN 300 MG: 300 CAPSULE ORAL at 14:02

## 2025-08-26 RX ADMIN — GABAPENTIN 300 MG: 300 CAPSULE ORAL at 05:55

## 2025-08-26 RX ADMIN — KETOROLAC TROMETHAMINE 15 MG: 30 INJECTION INTRAMUSCULAR; INTRAVENOUS at 08:49

## 2025-08-26 RX ADMIN — ONDANSETRON 4 MG: 2 INJECTION INTRAMUSCULAR; INTRAVENOUS at 13:06

## 2025-08-26 RX ADMIN — SIMETHICONE 80 MG: 80 TABLET, CHEWABLE ORAL at 13:06

## 2025-08-26 RX ADMIN — ONDANSETRON 4 MG: 2 INJECTION INTRAMUSCULAR; INTRAVENOUS at 00:29

## 2025-08-26 RX ADMIN — KETOROLAC TROMETHAMINE 15 MG: 30 INJECTION INTRAMUSCULAR; INTRAVENOUS at 02:16

## 2025-08-26 RX ADMIN — ACETAMINOPHEN 1000 MG: 650 SOLUTION ORAL at 08:49

## 2025-08-27 ENCOUNTER — TRANSITIONAL CARE MANAGEMENT TELEPHONE ENCOUNTER (OUTPATIENT)
Dept: CALL CENTER | Facility: HOSPITAL | Age: 25
End: 2025-08-27
Payer: COMMERCIAL

## 2025-08-27 ENCOUNTER — TELEPHONE (OUTPATIENT)
Dept: SURGERY | Facility: CLINIC | Age: 25
End: 2025-08-27
Payer: COMMERCIAL

## (undated) DEVICE — YANKAUER,BULB TIP WITH VENT: Brand: ARGYLE

## (undated) DEVICE — ENDOPATH PNEUMONEEDLE INSUFFLATION NEEDLES WITH LUER LOCK CONNECTORS 120MM: Brand: ENDOPATH

## (undated) DEVICE — SENSR O2 OXIMAX FNGR A/ 18IN NONSTR

## (undated) DEVICE — STRIP,CLOSURE,WOUND,MEDI-STRIP,1/2X4: Brand: MEDLINE

## (undated) DEVICE — STPCK 4WY ON/OFF VLV M/COLAR FIT 45PSI STRL

## (undated) DEVICE — CONMED SCOPE SAVER BITE BLOCK, 20X27 MM: Brand: SCOPE SAVER

## (undated) DEVICE — GLV SURG SENSICARE W/ALOE PF LF 7.5 STRL

## (undated) DEVICE — ANTIBACTERIAL UNDYED BRAIDED (POLYGLACTIN 910), SYNTHETIC ABSORBABLE SUTURE: Brand: COATED VICRYL

## (undated) DEVICE — ENDOPATH XCEL WITH OPTIVIEW TECHNOLOGY UNIVERSAL TROCAR STABILITY SLEEVES: Brand: ENDOPATH XCEL OPTIVIEW

## (undated) DEVICE — DEFENDO AIR WATER SUCTION AND BIOPSY VALVE KIT FOR  OLYMPUS: Brand: DEFENDO AIR/WATER/SUCTION AND BIOPSY VALVE

## (undated) DEVICE — CUFF,BP,DISP,1 TUBE,ADULT,HP: Brand: MEDLINE

## (undated) DEVICE — Device: Brand: DEFENDO AIR/WATER/SUCTION AND BIOPSY VALVE

## (undated) DEVICE — ADHS LIQ MASTISOL 2/3ML

## (undated) DEVICE — GLV SURG SENSICARE PI ORTHO SZ7.5 LF STRL

## (undated) DEVICE — ARM DRAPE

## (undated) DEVICE — KT VLV BIOP DEFENDO SXN AIR/WATER

## (undated) DEVICE — CUFF BP 1TB CONN/BAYO A/

## (undated) DEVICE — 4-PORT MANIFOLD: Brand: NEPTUNE 2

## (undated) DEVICE — BITEBLOCK ENDO W/STRAP 60F A/ LF DISP

## (undated) DEVICE — ENDOPATH XCEL BLADELESS TROCARS WITH STABILITY SLEEVES: Brand: ENDOPATH XCEL

## (undated) DEVICE — ST TB EXT STANDARDBORE 30IN

## (undated) DEVICE — TBG SMPL FLTR LINE NASL 02/C02 A/ BX/100

## (undated) DEVICE — SUCTION IRRIGATOR: Brand: ENDOWRIST

## (undated) DEVICE — TP SXN YANKR W/VENT STRL

## (undated) DEVICE — FRCP BX RADJAW4 NDL 2.8 240 STD OG

## (undated) DEVICE — PENCL ES MEGADINE EZ/CLEAN BUTN W/HOLSTR 10FT

## (undated) DEVICE — PATIENT RETURN ELECTRODE, SINGLE-USE, CONTACT QUALITY MONITORING, ADULT, WITH 9FT CORD, FOR PATIENTS WEIGING OVER 33LBS. (15KG): Brand: MEGADYNE

## (undated) DEVICE — 3M™ IOBAN™ 2 ANTIMICROBIAL INCISE DRAPE 6650EZ: Brand: IOBAN™ 2

## (undated) DEVICE — SINGLE-USE BIOPSY FORCEPS: Brand: RADIAL JAW 4

## (undated) DEVICE — SEAL

## (undated) DEVICE — THE CHANNEL CLEANING BRUSH IS A NYLON FLEXI BRUSH ATTACHED TO A FLEXIBLE PLASTIC SHEATH DESIGNED TO SAFELY REMOVE DEBRIS FROM FLEXIBLE ENDOSCOPES.

## (undated) DEVICE — FOR PERCUTANEOUS INSERTION, COMBO PACK INCLUDES:(PMIORCA20) DISPOSABLE OPERATIVE CHOLANGIOGRAM CATHETER 20" & (PIN-15) DISPOSABLE PERCUTANEOUS INTRODUCER 4" LENGTH AND 14 GAUGE: Brand: PMI

## (undated) DEVICE — SYR LUERLOK 50ML

## (undated) DEVICE — ENDOPATH XCEL WITH OPTIVIEW TECHNOLOGY BLADELESS TROCARS WITH STABILITY SLEEVES: Brand: ENDOPATH XCEL OPTIVIEW

## (undated) DEVICE — SYR LL TP 10ML STRL

## (undated) DEVICE — BLANKT WARM LOWR/BDY 100X120CM

## (undated) DEVICE — BRSH CLN CH 1.7MM 230CM 5TO7MM

## (undated) DEVICE — Device

## (undated) DEVICE — NDL FLTR BLNT 18G 1 1/2IN

## (undated) DEVICE — KT CLN CLEANOR SCPE

## (undated) DEVICE — DAVINCI: Brand: MEDLINE INDUSTRIES, INC.

## (undated) DEVICE — MONOPOLAR METZENBAUM SCISSOR, MINI BLADE TIP, DISPOSABLE: Brand: MONOPOLAR METZENBAUM SCISSOR, MINI BLADE TIP, DISPOSABLE

## (undated) DEVICE — DECANTER: Brand: UNBRANDED

## (undated) DEVICE — CANN SMPL SOFTECH BIFLO ETCO2 A/M 7FT

## (undated) DEVICE — FRCP BIOP ENDO CAPTURA/PRO SERR SPK 2.8MM 230CM

## (undated) DEVICE — SUT VIC 0 SUTUPAK TIES 18IN J906G

## (undated) DEVICE — ENDOPOUCH RETRIEVER SPECIMEN RETRIEVAL BAGS: Brand: ENDOPOUCH RETRIEVER

## (undated) DEVICE — PAD LAP CHOLE: Brand: MEDLINE INDUSTRIES, INC.

## (undated) DEVICE — TRAP FLD MINIVAC MEGADYNE 100ML

## (undated) DEVICE — ARGYLE YANKAUER BULB TIP WITH VENT: Brand: ARGYLE

## (undated) DEVICE — BITEBLOCK SCOPESAVER LG LF

## (undated) DEVICE — BLADELESS OBTURATOR: Brand: WECK VISTA

## (undated) DEVICE — ELECTRD L HK EZ CLN 33CM

## (undated) DEVICE — VESSEL SEALER EXTEND: Brand: ENDOWRIST